# Patient Record
Sex: FEMALE | Race: WHITE | NOT HISPANIC OR LATINO | URBAN - METROPOLITAN AREA
[De-identification: names, ages, dates, MRNs, and addresses within clinical notes are randomized per-mention and may not be internally consistent; named-entity substitution may affect disease eponyms.]

---

## 2017-05-26 ENCOUNTER — INPATIENT (INPATIENT)
Facility: HOSPITAL | Age: 72
LOS: 4 days | Discharge: HOME HEALTH | End: 2017-05-31
Attending: ORTHOPAEDIC SURGERY | Admitting: ORTHOPAEDIC SURGERY
Payer: MEDICARE

## 2017-05-26 VITALS
HEART RATE: 60 BPM | WEIGHT: 134.92 LBS | TEMPERATURE: 98 F | SYSTOLIC BLOOD PRESSURE: 155 MMHG | RESPIRATION RATE: 18 BRPM | HEIGHT: 64 IN | DIASTOLIC BLOOD PRESSURE: 99 MMHG | OXYGEN SATURATION: 99 %

## 2017-05-26 DIAGNOSIS — Z96.643 PRESENCE OF ARTIFICIAL HIP JOINT, BILATERAL: Chronic | ICD-10-CM

## 2017-05-26 LAB
ALBUMIN SERPL ELPH-MCNC: 3.7 G/DL — SIGNIFICANT CHANGE UP (ref 3.3–5)
ALP SERPL-CCNC: 52 U/L — SIGNIFICANT CHANGE UP (ref 40–120)
ALT FLD-CCNC: 29 U/L — SIGNIFICANT CHANGE UP (ref 12–78)
ANION GAP SERPL CALC-SCNC: 5 MMOL/L — SIGNIFICANT CHANGE UP (ref 5–17)
AST SERPL-CCNC: 22 U/L — SIGNIFICANT CHANGE UP (ref 15–37)
BASOPHILS # BLD AUTO: 0.1 K/UL — SIGNIFICANT CHANGE UP (ref 0–0.2)
BASOPHILS NFR BLD AUTO: 0.5 % — SIGNIFICANT CHANGE UP (ref 0–2)
BILIRUB SERPL-MCNC: 0.4 MG/DL — SIGNIFICANT CHANGE UP (ref 0.2–1.2)
BUN SERPL-MCNC: 21 MG/DL — SIGNIFICANT CHANGE UP (ref 7–23)
CALCIUM SERPL-MCNC: 8.4 MG/DL — LOW (ref 8.5–10.1)
CHLORIDE SERPL-SCNC: 106 MMOL/L — SIGNIFICANT CHANGE UP (ref 96–108)
CO2 SERPL-SCNC: 25 MMOL/L — SIGNIFICANT CHANGE UP (ref 22–31)
CREAT SERPL-MCNC: 1.02 MG/DL — SIGNIFICANT CHANGE UP (ref 0.5–1.3)
EOSINOPHIL # BLD AUTO: 0 K/UL — SIGNIFICANT CHANGE UP (ref 0–0.5)
EOSINOPHIL NFR BLD AUTO: 0.2 % — SIGNIFICANT CHANGE UP (ref 0–6)
GLUCOSE SERPL-MCNC: 135 MG/DL — HIGH (ref 70–99)
HCT VFR BLD CALC: 38.1 % — SIGNIFICANT CHANGE UP (ref 34.5–45)
HGB BLD-MCNC: 13.3 G/DL — SIGNIFICANT CHANGE UP (ref 11.5–15.5)
LYMPHOCYTES # BLD AUTO: 1.5 K/UL — SIGNIFICANT CHANGE UP (ref 1–3.3)
LYMPHOCYTES # BLD AUTO: 12.4 % — LOW (ref 13–44)
MCHC RBC-ENTMCNC: 33.2 PG — SIGNIFICANT CHANGE UP (ref 27–34)
MCHC RBC-ENTMCNC: 34.8 GM/DL — SIGNIFICANT CHANGE UP (ref 32–36)
MCV RBC AUTO: 95.4 FL — SIGNIFICANT CHANGE UP (ref 80–100)
MONOCYTES # BLD AUTO: 0.4 K/UL — SIGNIFICANT CHANGE UP (ref 0–0.9)
MONOCYTES NFR BLD AUTO: 3.3 % — SIGNIFICANT CHANGE UP (ref 2–14)
NEUTROPHILS # BLD AUTO: 10 K/UL — HIGH (ref 1.8–7.4)
NEUTROPHILS NFR BLD AUTO: 83.7 % — HIGH (ref 43–77)
PLATELET # BLD AUTO: 189 K/UL — SIGNIFICANT CHANGE UP (ref 150–400)
POTASSIUM SERPL-MCNC: 4.4 MMOL/L — SIGNIFICANT CHANGE UP (ref 3.5–5.3)
POTASSIUM SERPL-SCNC: 4.4 MMOL/L — SIGNIFICANT CHANGE UP (ref 3.5–5.3)
PROT SERPL-MCNC: 6.6 GM/DL — SIGNIFICANT CHANGE UP (ref 6–8.3)
RBC # BLD: 4 M/UL — SIGNIFICANT CHANGE UP (ref 3.8–5.2)
RBC # FLD: 12 % — SIGNIFICANT CHANGE UP (ref 10.3–14.5)
SODIUM SERPL-SCNC: 136 MMOL/L — SIGNIFICANT CHANGE UP (ref 135–145)
WBC # BLD: 12 K/UL — HIGH (ref 3.8–10.5)
WBC # FLD AUTO: 12 K/UL — HIGH (ref 3.8–10.5)

## 2017-05-26 PROCEDURE — 71010: CPT | Mod: 26

## 2017-05-26 PROCEDURE — 73502 X-RAY EXAM HIP UNI 2-3 VIEWS: CPT | Mod: 26

## 2017-05-26 PROCEDURE — 73590 X-RAY EXAM OF LOWER LEG: CPT | Mod: 26,RT

## 2017-05-26 PROCEDURE — 73562 X-RAY EXAM OF KNEE 3: CPT | Mod: 26,RT

## 2017-05-26 PROCEDURE — 73552 X-RAY EXAM OF FEMUR 2/>: CPT | Mod: 26

## 2017-05-26 RX ORDER — ONDANSETRON 8 MG/1
4 TABLET, FILM COATED ORAL ONCE
Qty: 0 | Refills: 0 | Status: COMPLETED | OUTPATIENT
Start: 2017-05-26 | End: 2017-05-26

## 2017-05-26 RX ORDER — MORPHINE SULFATE 50 MG/1
4 CAPSULE, EXTENDED RELEASE ORAL ONCE
Qty: 0 | Refills: 0 | Status: DISCONTINUED | OUTPATIENT
Start: 2017-05-26 | End: 2017-05-27

## 2017-05-26 RX ORDER — MORPHINE SULFATE 50 MG/1
4 CAPSULE, EXTENDED RELEASE ORAL ONCE
Qty: 0 | Refills: 0 | Status: DISCONTINUED | OUTPATIENT
Start: 2017-05-26 | End: 2017-05-26

## 2017-05-26 RX ORDER — SODIUM CHLORIDE 9 MG/ML
1000 INJECTION INTRAMUSCULAR; INTRAVENOUS; SUBCUTANEOUS ONCE
Qty: 0 | Refills: 0 | Status: COMPLETED | OUTPATIENT
Start: 2017-05-26 | End: 2017-05-26

## 2017-05-26 RX ADMIN — MORPHINE SULFATE 4 MILLIGRAM(S): 50 CAPSULE, EXTENDED RELEASE ORAL at 20:01

## 2017-05-26 RX ADMIN — MORPHINE SULFATE 4 MILLIGRAM(S): 50 CAPSULE, EXTENDED RELEASE ORAL at 19:46

## 2017-05-26 RX ADMIN — SODIUM CHLORIDE 1000 MILLILITER(S): 9 INJECTION INTRAMUSCULAR; INTRAVENOUS; SUBCUTANEOUS at 19:46

## 2017-05-26 RX ADMIN — ONDANSETRON 4 MILLIGRAM(S): 8 TABLET, FILM COATED ORAL at 19:46

## 2017-05-26 NOTE — ED PROVIDER NOTE - PROGRESS NOTE DETAILS
Fani Hughes: Xray reviewed: Convoluted proximal tibial fx, involing tibial plataeu w/ ? LCL involvement given some lat displacement. Fx appears in multiple plains. Ortho is paged. Fani Hughes: RAFAEL Hughes, am scribing for and in the presence of Dr. Gomez  the following sections: HPI, PMH, Intake, Results, ROS, PE, Progress note, Disposition Yosef Gomez DO (Attending): Seen by ortho, admit to Dr. Rondon

## 2017-05-26 NOTE — ED PROVIDER NOTE - CONSTITUTIONAL, MLM
normal... Well appearing, well nourished, awake, alert, oriented to person, place, time/situation and in no apparent distress. GCS 15

## 2017-05-26 NOTE — ED PROVIDER NOTE - OBJECTIVE STATEMENT
71F c/o right leg pain s/p fall at home PTA. Pt was outside on a 4 foot ladder when it fell from benath her and she landed directly on her right leg. c/o right knee swelling and pain and a "crunching" feeling. Denies head injury, LOC or pain anywhere else. PMH bilateral hip replacements at [facility]. Denies hip pin. PMD [place].

## 2017-05-26 NOTE — ED ADULT NURSE NOTE - CHPI ED SYMPTOMS NEG
no nausea/no tingling/no numbness/no weakness/no vomiting/no dizziness/no chills/no fever/no decreased eating/drinking

## 2017-05-26 NOTE — ED PROVIDER NOTE - NS ED MD SCRIBE ATTENDING SCRIBE SECTIONS
CONSULTATIONS/SHIFT CHANGE/PROGRESS NOTE/PHYSICAL EXAM/REVIEW OF SYSTEMS/VITAL SIGNS( Pullset)/HIV/RESULTS/PAST MEDICAL/SURGICAL/SOCIAL HISTORY/HISTORY OF PRESENT ILLNESS/DISPOSITION/INTAKE ASSESSMENT/SCREENINGS

## 2017-05-26 NOTE — ED PROVIDER NOTE - MUSCULOSKELETAL, MLM
no spinal tenderness, no chest or abdominal tenderness. +swelling and tenderness to right knee. 2+ DP/PT pulse, foot warm cap refill< 2 sec all toes.

## 2017-05-26 NOTE — ED PROVIDER NOTE - ATTENDING CONTRIBUTION TO CARE
I, Yosef Gomez DO,  performed the initial face to face bedside interview with this patient regarding history of present illness, review of symptoms and relevant past medical, social and family history.  I completed an independent physical examination.  I was the initial provider who evaluated this patient. I have signed out the follow up of any pending tests (i.e. labs, radiological studies) to the ACP.  I have communicated the patient’s plan of care and disposition with the ACP.  The history, relevant review of systems, past medical and surgical history, medical decision making, and physical examination was documented by the scribe in my presence and I attest to the accuracy of the documentation.

## 2017-05-27 LAB
ABO RH CONFIRMATION: SIGNIFICANT CHANGE UP
ANION GAP SERPL CALC-SCNC: 7 MMOL/L — SIGNIFICANT CHANGE UP (ref 5–17)
APTT BLD: 29.7 SEC — SIGNIFICANT CHANGE UP (ref 27.5–37.4)
APTT BLD: 30 SEC — SIGNIFICANT CHANGE UP (ref 27.5–37.4)
BLD GP AB SCN SERPL QL: SIGNIFICANT CHANGE UP
BUN SERPL-MCNC: 16 MG/DL — SIGNIFICANT CHANGE UP (ref 7–23)
CALCIUM SERPL-MCNC: 8.2 MG/DL — LOW (ref 8.5–10.1)
CHLORIDE SERPL-SCNC: 104 MMOL/L — SIGNIFICANT CHANGE UP (ref 96–108)
CO2 SERPL-SCNC: 25 MMOL/L — SIGNIFICANT CHANGE UP (ref 22–31)
CREAT SERPL-MCNC: 0.81 MG/DL — SIGNIFICANT CHANGE UP (ref 0.5–1.3)
GLUCOSE SERPL-MCNC: 104 MG/DL — HIGH (ref 70–99)
HCT VFR BLD CALC: 35.2 % — SIGNIFICANT CHANGE UP (ref 34.5–45)
HGB BLD-MCNC: 12.2 G/DL — SIGNIFICANT CHANGE UP (ref 11.5–15.5)
INR BLD: 1 RATIO — SIGNIFICANT CHANGE UP (ref 0.88–1.16)
INR BLD: 1.02 RATIO — SIGNIFICANT CHANGE UP (ref 0.88–1.16)
MCHC RBC-ENTMCNC: 33.2 PG — SIGNIFICANT CHANGE UP (ref 27–34)
MCHC RBC-ENTMCNC: 34.6 GM/DL — SIGNIFICANT CHANGE UP (ref 32–36)
MCV RBC AUTO: 96.2 FL — SIGNIFICANT CHANGE UP (ref 80–100)
PLATELET # BLD AUTO: 174 K/UL — SIGNIFICANT CHANGE UP (ref 150–400)
POTASSIUM SERPL-MCNC: 3.9 MMOL/L — SIGNIFICANT CHANGE UP (ref 3.5–5.3)
POTASSIUM SERPL-SCNC: 3.9 MMOL/L — SIGNIFICANT CHANGE UP (ref 3.5–5.3)
PROTHROM AB SERPL-ACNC: 10.8 SEC — SIGNIFICANT CHANGE UP (ref 9.8–12.7)
PROTHROM AB SERPL-ACNC: 11 SEC — SIGNIFICANT CHANGE UP (ref 9.8–12.7)
RBC # BLD: 3.66 M/UL — LOW (ref 3.8–5.2)
RBC # FLD: 11.9 % — SIGNIFICANT CHANGE UP (ref 10.3–14.5)
SODIUM SERPL-SCNC: 136 MMOL/L — SIGNIFICANT CHANGE UP (ref 135–145)
TYPE + AB SCN PNL BLD: SIGNIFICANT CHANGE UP
WBC # BLD: 9 K/UL — SIGNIFICANT CHANGE UP (ref 3.8–10.5)
WBC # FLD AUTO: 9 K/UL — SIGNIFICANT CHANGE UP (ref 3.8–10.5)

## 2017-05-27 PROCEDURE — 73700 CT LOWER EXTREMITY W/O DYE: CPT | Mod: 26,RT

## 2017-05-27 PROCEDURE — 73560 X-RAY EXAM OF KNEE 1 OR 2: CPT | Mod: 26,RT

## 2017-05-27 PROCEDURE — 99285 EMERGENCY DEPT VISIT HI MDM: CPT

## 2017-05-27 PROCEDURE — 93010 ELECTROCARDIOGRAM REPORT: CPT

## 2017-05-27 PROCEDURE — 76377 3D RENDER W/INTRP POSTPROCES: CPT | Mod: 26

## 2017-05-27 RX ORDER — SODIUM CHLORIDE 9 MG/ML
1000 INJECTION INTRAMUSCULAR; INTRAVENOUS; SUBCUTANEOUS
Qty: 0 | Refills: 0 | Status: DISCONTINUED | OUTPATIENT
Start: 2017-05-27 | End: 2017-05-27

## 2017-05-27 RX ORDER — METOCLOPRAMIDE HCL 10 MG
10 TABLET ORAL EVERY 6 HOURS
Qty: 0 | Refills: 0 | Status: DISCONTINUED | OUTPATIENT
Start: 2017-05-27 | End: 2017-05-27

## 2017-05-27 RX ORDER — ACETAMINOPHEN 500 MG
650 TABLET ORAL EVERY 6 HOURS
Qty: 0 | Refills: 0 | Status: DISCONTINUED | OUTPATIENT
Start: 2017-05-27 | End: 2017-05-28

## 2017-05-27 RX ORDER — KETOROLAC TROMETHAMINE 30 MG/ML
15 SYRINGE (ML) INJECTION EVERY 6 HOURS
Qty: 0 | Refills: 0 | Status: DISCONTINUED | OUTPATIENT
Start: 2017-05-27 | End: 2017-05-27

## 2017-05-27 RX ORDER — ONDANSETRON 8 MG/1
4 TABLET, FILM COATED ORAL EVERY 6 HOURS
Qty: 0 | Refills: 0 | Status: COMPLETED | OUTPATIENT
Start: 2017-05-27 | End: 2017-05-27

## 2017-05-27 RX ORDER — KETOROLAC TROMETHAMINE 30 MG/ML
15 SYRINGE (ML) INJECTION EVERY 6 HOURS
Qty: 0 | Refills: 0 | Status: DISCONTINUED | OUTPATIENT
Start: 2017-05-27 | End: 2017-05-28

## 2017-05-27 RX ORDER — DOCUSATE SODIUM 100 MG
100 CAPSULE ORAL THREE TIMES A DAY
Qty: 0 | Refills: 0 | Status: DISCONTINUED | OUTPATIENT
Start: 2017-05-27 | End: 2017-05-27

## 2017-05-27 RX ORDER — SODIUM CHLORIDE 9 MG/ML
1000 INJECTION INTRAMUSCULAR; INTRAVENOUS; SUBCUTANEOUS
Qty: 0 | Refills: 0 | Status: DISCONTINUED | OUTPATIENT
Start: 2017-05-27 | End: 2017-05-28

## 2017-05-27 RX ORDER — OXYCODONE HYDROCHLORIDE 5 MG/1
5 TABLET ORAL EVERY 4 HOURS
Qty: 0 | Refills: 0 | Status: DISCONTINUED | OUTPATIENT
Start: 2017-05-27 | End: 2017-05-28

## 2017-05-27 RX ORDER — FERROUS SULFATE 325(65) MG
325 TABLET ORAL
Qty: 0 | Refills: 0 | Status: DISCONTINUED | OUTPATIENT
Start: 2017-05-27 | End: 2017-05-28

## 2017-05-27 RX ORDER — ACETAMINOPHEN 500 MG
325 TABLET ORAL EVERY 4 HOURS
Qty: 0 | Refills: 0 | Status: DISCONTINUED | OUTPATIENT
Start: 2017-05-27 | End: 2017-05-27

## 2017-05-27 RX ORDER — DIPHENHYDRAMINE HCL 50 MG
25 CAPSULE ORAL AT BEDTIME
Qty: 0 | Refills: 0 | Status: DISCONTINUED | OUTPATIENT
Start: 2017-05-27 | End: 2017-05-28

## 2017-05-27 RX ORDER — DOCUSATE SODIUM 100 MG
200 CAPSULE ORAL DAILY
Qty: 0 | Refills: 0 | Status: DISCONTINUED | OUTPATIENT
Start: 2017-05-27 | End: 2017-05-28

## 2017-05-27 RX ORDER — FOLIC ACID 0.8 MG
1 TABLET ORAL DAILY
Qty: 0 | Refills: 0 | Status: DISCONTINUED | OUTPATIENT
Start: 2017-05-27 | End: 2017-05-28

## 2017-05-27 RX ORDER — OXYCODONE HYDROCHLORIDE 5 MG/1
10 TABLET ORAL EVERY 4 HOURS
Qty: 0 | Refills: 0 | Status: DISCONTINUED | OUTPATIENT
Start: 2017-05-27 | End: 2017-05-28

## 2017-05-27 RX ORDER — MAGNESIUM HYDROXIDE 400 MG/1
30 TABLET, CHEWABLE ORAL DAILY
Qty: 0 | Refills: 0 | Status: DISCONTINUED | OUTPATIENT
Start: 2017-05-27 | End: 2017-05-28

## 2017-05-27 RX ORDER — HYDROMORPHONE HYDROCHLORIDE 2 MG/ML
0.5 INJECTION INTRAMUSCULAR; INTRAVENOUS; SUBCUTANEOUS EVERY 4 HOURS
Qty: 0 | Refills: 0 | Status: DISCONTINUED | OUTPATIENT
Start: 2017-05-27 | End: 2017-05-27

## 2017-05-27 RX ORDER — SERTRALINE 25 MG/1
100 TABLET, FILM COATED ORAL DAILY
Qty: 0 | Refills: 0 | Status: DISCONTINUED | OUTPATIENT
Start: 2017-05-27 | End: 2017-05-28

## 2017-05-27 RX ORDER — HYDROMORPHONE HYDROCHLORIDE 2 MG/ML
1 INJECTION INTRAMUSCULAR; INTRAVENOUS; SUBCUTANEOUS
Qty: 0 | Refills: 0 | Status: DISCONTINUED | OUTPATIENT
Start: 2017-05-27 | End: 2017-05-28

## 2017-05-27 RX ORDER — ASCORBIC ACID 60 MG
500 TABLET,CHEWABLE ORAL
Qty: 0 | Refills: 0 | Status: DISCONTINUED | OUTPATIENT
Start: 2017-05-27 | End: 2017-05-28

## 2017-05-27 RX ORDER — HEPARIN SODIUM 5000 [USP'U]/ML
5000 INJECTION INTRAVENOUS; SUBCUTANEOUS EVERY 8 HOURS
Qty: 0 | Refills: 0 | Status: COMPLETED | OUTPATIENT
Start: 2017-05-27 | End: 2017-05-27

## 2017-05-27 RX ADMIN — Medication 15 MILLIGRAM(S): at 18:42

## 2017-05-27 RX ADMIN — HYDROMORPHONE HYDROCHLORIDE 0.5 MILLIGRAM(S): 2 INJECTION INTRAMUSCULAR; INTRAVENOUS; SUBCUTANEOUS at 12:27

## 2017-05-27 RX ADMIN — Medication 325 MILLIGRAM(S): at 09:53

## 2017-05-27 RX ADMIN — ONDANSETRON 4 MILLIGRAM(S): 8 TABLET, FILM COATED ORAL at 18:56

## 2017-05-27 RX ADMIN — Medication 15 MILLIGRAM(S): at 13:27

## 2017-05-27 RX ADMIN — SODIUM CHLORIDE 83 MILLILITER(S): 9 INJECTION INTRAMUSCULAR; INTRAVENOUS; SUBCUTANEOUS at 23:31

## 2017-05-27 RX ADMIN — OXYCODONE HYDROCHLORIDE 10 MILLIGRAM(S): 5 TABLET ORAL at 01:55

## 2017-05-27 RX ADMIN — MORPHINE SULFATE 4 MILLIGRAM(S): 50 CAPSULE, EXTENDED RELEASE ORAL at 00:31

## 2017-05-27 RX ADMIN — Medication 325 MILLIGRAM(S): at 17:06

## 2017-05-27 RX ADMIN — HYDROMORPHONE HYDROCHLORIDE 0.5 MILLIGRAM(S): 2 INJECTION INTRAMUSCULAR; INTRAVENOUS; SUBCUTANEOUS at 08:15

## 2017-05-27 RX ADMIN — Medication 1 MILLIGRAM(S): at 12:27

## 2017-05-27 RX ADMIN — HEPARIN SODIUM 5000 UNIT(S): 5000 INJECTION INTRAVENOUS; SUBCUTANEOUS at 17:06

## 2017-05-27 RX ADMIN — HYDROMORPHONE HYDROCHLORIDE 0.5 MILLIGRAM(S): 2 INJECTION INTRAMUSCULAR; INTRAVENOUS; SUBCUTANEOUS at 02:50

## 2017-05-27 RX ADMIN — Medication 325 MILLIGRAM(S): at 12:27

## 2017-05-27 RX ADMIN — SODIUM CHLORIDE 100 MILLILITER(S): 9 INJECTION INTRAMUSCULAR; INTRAVENOUS; SUBCUTANEOUS at 02:45

## 2017-05-27 RX ADMIN — HYDROMORPHONE HYDROCHLORIDE 0.5 MILLIGRAM(S): 2 INJECTION INTRAMUSCULAR; INTRAVENOUS; SUBCUTANEOUS at 03:59

## 2017-05-27 RX ADMIN — SERTRALINE 100 MILLIGRAM(S): 25 TABLET, FILM COATED ORAL at 12:28

## 2017-05-27 RX ADMIN — OXYCODONE HYDROCHLORIDE 10 MILLIGRAM(S): 5 TABLET ORAL at 06:18

## 2017-05-27 RX ADMIN — Medication 1 TABLET(S): at 12:28

## 2017-05-27 RX ADMIN — Medication 15 MILLIGRAM(S): at 23:31

## 2017-05-27 RX ADMIN — SODIUM CHLORIDE 100 MILLILITER(S): 9 INJECTION INTRAMUSCULAR; INTRAVENOUS; SUBCUTANEOUS at 12:28

## 2017-05-27 RX ADMIN — Medication 15 MILLIGRAM(S): at 18:39

## 2017-05-27 RX ADMIN — HEPARIN SODIUM 5000 UNIT(S): 5000 INJECTION INTRAVENOUS; SUBCUTANEOUS at 09:53

## 2017-05-27 RX ADMIN — Medication 500 MILLIGRAM(S): at 17:06

## 2017-05-27 RX ADMIN — Medication 10 MILLIGRAM(S): at 13:21

## 2017-05-27 RX ADMIN — OXYCODONE HYDROCHLORIDE 10 MILLIGRAM(S): 5 TABLET ORAL at 03:59

## 2017-05-27 RX ADMIN — MORPHINE SULFATE 4 MILLIGRAM(S): 50 CAPSULE, EXTENDED RELEASE ORAL at 00:16

## 2017-05-27 NOTE — H&P ADULT - NSHPPHYSICALEXAM_GEN_ALL_CORE
AAOx3   Vital Signs Last 24 Hrs  T(C): 37.3, Max: 37.3 (05-26 @ 22:44)  T(F): 99.1, Max: 99.1 (05-26 @ 22:44)  HR: 55 (55 - 60)  BP: 134/74 (134/74 - 155/99)  BP(mean): --  RR: 18 (18 - 18)  SpO2: 100% (99% - 100%)    Head ATNC   Right knee unable to asses stability secondary to pt condition, Skin is intact over fx area, she has obvious swelling but compartments are soft and compressible, there is a palpable DP/PT pulse equal to the opposite side. E/F/T/G M+, L2-S1 Is SILT.   She has no TTP or Pain with ROM to any other joints or long bones in both the upper and lower extremities and she has no back or neck pain on exam.

## 2017-05-27 NOTE — CONSULT NOTE ADULT - SUBJECTIVE AND OBJECTIVE BOX
71 female, denies any past medical history other than anxiety and depression for which she is medicated, s/p bilateral total hips done at [facility] by Ti, and L ACL Recon done in the 80's. Here today s/p fall around 4 pm off a ladder at aprroximatly 4 feet above the ground. experienced an axial load through her foot and had immediate pain in her right knee. she was unable to ambulate and was brought to [facility] where xr revealed a bicondylar fx of her tibial plateau. She denies HS or LOC, She denies any other pain.     Excellent functional status. Very active, dances vigorously each week wi/o central CP/SOB/palpitations, dizziness. No prior cardiopulmonary Hx.    c/t experience breakthrough pain & spasm in the RLE.     FamHx: non-contributory to current presentation  SocHx: lifelong nonsmoker, daily 1 glass of wine, no illicits; health teacher & masseuse  Allergies: NKDA    Physical Exam  Gen: alert, NAD  CV: reg, no M/R/G  Resp: CTA B/L  Abd: soft, NT/ND, (+) BS  Extrems: no C/C/E    T(C): 36.8, Max: 37.3 (05-26 @ 22:44)  HR: 62 (55 - 62)  BP: 109/61 (109/61 - 155/99)  RR: 18 (18 - 20)  SpO2: 97% (97% - 100%)  Wt(kg): --                        12.2   9.0   )-----------( 174      ( 27 May 2017 10:00 )             35.2     27 May 2017 10:00    136    |  104    |  16     ----------------------------<  104    3.9     |  25     |  0.81     Ca    8.2        27 May 2017 10:00    TPro  6.6    /  Alb  3.7    /  TBili  0.4    /  DBili  x      /  AST  22     /  ALT  29     /  AlkPhos  52     26 May 2017 21:05    PT/INR - ( 27 May 2017 10:00 )   PT: 11.0 sec;   INR: 1.02 ratio         PTT - ( 27 May 2017 10:00 )  PTT:30.0 sec  CAPILLARY BLOOD GLUCOSE    LIVER FUNCTIONS - ( 26 May 2017 21:05 )  Alb: 3.7 g/dL / Pro: 6.6 gm/dL / ALK PHOS: 52 U/L / ALT: 29 U/L / AST: 22 U/L / GGT: x           Assessment/Plan    * Traumatic right bicondylar tibial plateau fx   - increase analgesia, bowel regimen  - Toradol ATC x 36hrs  - NPO after midnight, anticipate OR 5/28  - Ortho f/u     * depression/anxiety, stable  - c/w Zoloft    * DVT PPx- heparin SQ    Pt is medically optimized with no cardiopulmonary contraindications to planned procedure.

## 2017-05-27 NOTE — H&P ADULT - ASSESSMENT
71 F with bicondylar tibial plateau fx   pain control   NPO   Admit for pain mgmt and compartment observation   Possible surgical fixation in the morning depending on swelling and pt condition   Medical eval   Bulky haywood knee immobilizer placed   CT Pending   To discuss with dr corado in AM

## 2017-05-27 NOTE — CONSULT NOTE ADULT - SUBJECTIVE AND OBJECTIVE BOX
CC- s/p mechanical fall    HPI:  70 yo/female, denies any past medical history other than anxiety and depression for which she is medicated, s/p bilateral total hips done at [facility] by Ti, and L ACL Recon done in the 80's. Here today s/p fall around 4 pm off a ladder at aprroximatly 4 feet above the ground. experienced an axial load through her foot and had immediate pain in her right knee. she was unable to ambulate and was brought to [facility] where xr revealed a bicondylar fx of her tibial plateau. She denies HS or LOC, She denies any other pain. (27 May 2017 00:34)  Medical consult called for postop medical management. Patient normally is very active, does not have chest pain or SOB on ambulation    PMH- as above  PSH- b/l THR  Soc hx- denies smoking, alcohol- socially  Fam hx- no cardiac diseases     T(C): 36.8, Max: 37.3 (05-26 @ 22:44)  HR: 62 (55 - 62)  BP: 109/61 (109/61 - 155/99)  RR: 18 (18 - 20)  SpO2: 97% (97% - 100%)  Wt(kg): --    LABS:                        12.2   9.0   )-----------( 174      ( 27 May 2017 10:00 )             35.2     05-27    136  |  104  |  16  ----------------------------<  104<H>  3.9   |  25  |  0.81    Ca    8.2<L>      27 May 2017 10:00  TPro  6.6  /  Alb  3.7  /  TBili  0.4  /  DBili  x   /  AST  22  /  ALT  29  /  AlkPhos  52  05-26  PT/INR - ( 27 May 2017 10:00 )   PT: 11.0 sec;   INR: 1.02 ratio    PTT - ( 27 May 2017 10:00 )  PTT:30.0 sec    RADIOLOGY & ADDITIONAL TESTS:  EXAM:  XR KNEE 2 VIEWS RT                        PROCEDURE DATE:  05/27/2017      INTERPRETATION:  Exam Date: 5/27/2017 3:17 AM  Clinical Information: Fall with right lower extremity pain  Technique: Single view of the pelvis , 2 views of theright hip, 2 views   of the left hip, 4 views of the right femur, 3 views of the right knee, 4   views of the right leg and 3 post reduction views of the right knee     Findings:    Prior bilateral total hip replacements. Degenerative changes of the   pelvis partially visualized lumbar sacral spine. No definite pelvic or   bilateral hip fracture. No right femur fracture. Moderate right   suprapatellar lipohemarthrosis.  Comminuted displaced impacted right   posterior and medial tibial plateau fracture extending into the proximal   tibia. Tibiofemoral subluxation. Partially visualized ankle mortise   appears intact. No definite fibular fracture.    Post reduction imaging demonstrates persistent comminuted displaced   intra-articular tibial plateau fracture. Additional subluxation of the   tibiofemoral joint persists.    Impression:  Comminuted displaced impacted intra-articular fracture of the right   medial posterior tibial plateau extending into the proximal tibia with   associated lipohemarthrosis. Additional subluxation of the tibiofemoral   joint    PHYSICAL EXAM:  GENERAL: NAD, well-groomed, well-developed  HEAD:  Atraumatic, Normocephalic  EYES: EOMI, PERRLA, conjunctiva and sclera clear  HEENT: Moist mucous membranes  NECK: Supple, No JVD  NERVOUS SYSTEM:  Alert & Oriented X3, Motor Strength 5/5 B/L upper and lower extremities; DTRs 2+ intact and symmetric  CHEST/LUNG: Clear to auscultation bilaterally; No rales, rhonchi, wheezing, or rubs  HEART: Regular rate and rhythm; No murmurs, rubs, or gallops  ABDOMEN: Soft, Nontender, Nondistended; Bowel sounds present  GENITOURINARY- Voiding, no palpable bladder  EXTREMITIES:  2+ Peripheral Pulses, No clubbing, cyanosis, or edema  MUSCULOSKELTAL- Rt leg in immobilizer  SKIN-no rash, no lesion  CNS- alert, oriented X3, non focal     Daily Height in cm: 162.56 (26 May 2017 19:27)        sodium chloride 0.9%. 1000milliLiter(s) IV Continuous <Continuous>  oxyCODONE IR 10milliGRAM(s) Oral every 4 hours PRN  oxyCODONE IR 5milliGRAM(s) Oral every 4 hours PRN  metoclopramide Injectable 10milliGRAM(s) IV Push every 6 hours PRN  magnesium hydroxide Suspension 30milliLiter(s) Oral daily PRN  diphenhydrAMINE   Capsule 25milliGRAM(s) Oral at bedtime PRN  ferrous    sulfate 325milliGRAM(s) Oral three times a day with meals  folic acid 1milliGRAM(s) Oral daily  multivitamin 1Tablet(s) Oral daily  ascorbic acid 500milliGRAM(s) Oral two times a day  sertraline 100milliGRAM(s) Oral daily  heparin  Injectable 5000Unit(s) SubCutaneous every 8 hours  docusate sodium 200milliGRAM(s) Oral daily  acetaminophen  Suppository 650milliGRAM(s) Rectal every 6 hours PRN  ketorolac   Injectable 15milliGRAM(s) IV Push every 6 hours  HYDROmorphone  Injectable 1milliGRAM(s) IV Push every 3 hours PRN    Assessment/Plan  #RT tibial plateau fracture s/p mechanical fall  Ortho eval appreciated  Bedrest, Pain meds prn  Pt is medically optimized for OR    #Dispo- thank you for consult, will follow with you. Patient is medically optimized for OR

## 2017-05-27 NOTE — H&P ADULT - HISTORY OF PRESENT ILLNESS
71 female, denies any past medical history other than anxiety and depression for which she is medicated, s/p bilateral total hips done at [facility] by Ti, and L ACL Recon done in the 80's. Here today s/p fall around 4 pm off a ladder at aprroximatly 4 feet above the ground. experienced an axial load through her foot and had immediate pain in her right knee. she was unable to ambulate and was brought to [facility] where xr revealed a bicondylar fx of her tibial plateau. She denies HS or LOC, She denies any other pain.

## 2017-05-28 LAB
ANION GAP SERPL CALC-SCNC: 5 MMOL/L — SIGNIFICANT CHANGE UP (ref 5–17)
BUN SERPL-MCNC: 10 MG/DL — SIGNIFICANT CHANGE UP (ref 7–23)
CALCIUM SERPL-MCNC: 8.1 MG/DL — LOW (ref 8.5–10.1)
CHLORIDE SERPL-SCNC: 107 MMOL/L — SIGNIFICANT CHANGE UP (ref 96–108)
CO2 SERPL-SCNC: 27 MMOL/L — SIGNIFICANT CHANGE UP (ref 22–31)
CREAT SERPL-MCNC: 0.83 MG/DL — SIGNIFICANT CHANGE UP (ref 0.5–1.3)
GLUCOSE SERPL-MCNC: 103 MG/DL — HIGH (ref 70–99)
HCT VFR BLD CALC: 33.9 % — LOW (ref 34.5–45)
HGB BLD-MCNC: 11.3 G/DL — LOW (ref 11.5–15.5)
MAGNESIUM SERPL-MCNC: 2.1 MG/DL — SIGNIFICANT CHANGE UP (ref 1.6–2.6)
MCHC RBC-ENTMCNC: 32.5 PG — SIGNIFICANT CHANGE UP (ref 27–34)
MCHC RBC-ENTMCNC: 33.3 GM/DL — SIGNIFICANT CHANGE UP (ref 32–36)
MCV RBC AUTO: 97.5 FL — SIGNIFICANT CHANGE UP (ref 80–100)
PHOSPHATE SERPL-MCNC: 2.8 MG/DL — SIGNIFICANT CHANGE UP (ref 2.5–4.5)
PLATELET # BLD AUTO: 164 K/UL — SIGNIFICANT CHANGE UP (ref 150–400)
POTASSIUM SERPL-MCNC: 4.1 MMOL/L — SIGNIFICANT CHANGE UP (ref 3.5–5.3)
POTASSIUM SERPL-SCNC: 4.1 MMOL/L — SIGNIFICANT CHANGE UP (ref 3.5–5.3)
RBC # BLD: 3.48 M/UL — LOW (ref 3.8–5.2)
RBC # FLD: 12 % — SIGNIFICANT CHANGE UP (ref 10.3–14.5)
SODIUM SERPL-SCNC: 139 MMOL/L — SIGNIFICANT CHANGE UP (ref 135–145)
WBC # BLD: 7.4 K/UL — SIGNIFICANT CHANGE UP (ref 3.8–10.5)
WBC # FLD AUTO: 7.4 K/UL — SIGNIFICANT CHANGE UP (ref 3.8–10.5)

## 2017-05-28 PROCEDURE — 73560 X-RAY EXAM OF KNEE 1 OR 2: CPT | Mod: 26,RT

## 2017-05-28 RX ORDER — OXYCODONE HYDROCHLORIDE 5 MG/1
5 TABLET ORAL EVERY 4 HOURS
Qty: 0 | Refills: 0 | Status: DISCONTINUED | OUTPATIENT
Start: 2017-05-28 | End: 2017-05-31

## 2017-05-28 RX ORDER — SODIUM CHLORIDE 9 MG/ML
1000 INJECTION INTRAMUSCULAR; INTRAVENOUS; SUBCUTANEOUS
Qty: 0 | Refills: 0 | Status: DISCONTINUED | OUTPATIENT
Start: 2017-05-28 | End: 2017-05-28

## 2017-05-28 RX ORDER — ENOXAPARIN SODIUM 100 MG/ML
30 INJECTION SUBCUTANEOUS EVERY 24 HOURS
Qty: 0 | Refills: 0 | Status: DISCONTINUED | OUTPATIENT
Start: 2017-05-28 | End: 2017-05-29

## 2017-05-28 RX ORDER — DOCUSATE SODIUM 100 MG
100 CAPSULE ORAL THREE TIMES A DAY
Qty: 0 | Refills: 0 | Status: DISCONTINUED | OUTPATIENT
Start: 2017-05-28 | End: 2017-05-31

## 2017-05-28 RX ORDER — HYDROMORPHONE HYDROCHLORIDE 2 MG/ML
0.5 INJECTION INTRAMUSCULAR; INTRAVENOUS; SUBCUTANEOUS EVERY 4 HOURS
Qty: 0 | Refills: 0 | Status: DISCONTINUED | OUTPATIENT
Start: 2017-05-28 | End: 2017-05-29

## 2017-05-28 RX ORDER — ONDANSETRON 8 MG/1
4 TABLET, FILM COATED ORAL EVERY 6 HOURS
Qty: 0 | Refills: 0 | Status: DISCONTINUED | OUTPATIENT
Start: 2017-05-28 | End: 2017-05-31

## 2017-05-28 RX ORDER — ONDANSETRON 8 MG/1
4 TABLET, FILM COATED ORAL ONCE
Qty: 0 | Refills: 0 | Status: COMPLETED | OUTPATIENT
Start: 2017-05-28 | End: 2017-05-28

## 2017-05-28 RX ORDER — ASCORBIC ACID 60 MG
500 TABLET,CHEWABLE ORAL
Qty: 0 | Refills: 0 | Status: DISCONTINUED | OUTPATIENT
Start: 2017-05-28 | End: 2017-05-31

## 2017-05-28 RX ORDER — OXYCODONE HYDROCHLORIDE 5 MG/1
10 TABLET ORAL EVERY 4 HOURS
Qty: 0 | Refills: 0 | Status: DISCONTINUED | OUTPATIENT
Start: 2017-05-28 | End: 2017-05-31

## 2017-05-28 RX ORDER — NALOXONE HYDROCHLORIDE 4 MG/.1ML
0.1 SPRAY NASAL
Qty: 0 | Refills: 0 | Status: DISCONTINUED | OUTPATIENT
Start: 2017-05-28 | End: 2017-05-31

## 2017-05-28 RX ORDER — HYDROMORPHONE HYDROCHLORIDE 2 MG/ML
30 INJECTION INTRAMUSCULAR; INTRAVENOUS; SUBCUTANEOUS
Qty: 0 | Refills: 0 | Status: DISCONTINUED | OUTPATIENT
Start: 2017-05-28 | End: 2017-05-29

## 2017-05-28 RX ORDER — SODIUM CHLORIDE 9 MG/ML
1000 INJECTION INTRAMUSCULAR; INTRAVENOUS; SUBCUTANEOUS
Qty: 0 | Refills: 0 | Status: DISCONTINUED | OUTPATIENT
Start: 2017-05-28 | End: 2017-05-31

## 2017-05-28 RX ORDER — FOLIC ACID 0.8 MG
1 TABLET ORAL DAILY
Qty: 0 | Refills: 0 | Status: DISCONTINUED | OUTPATIENT
Start: 2017-05-28 | End: 2017-05-31

## 2017-05-28 RX ORDER — ACETAMINOPHEN 500 MG
1000 TABLET ORAL ONCE
Qty: 0 | Refills: 0 | Status: COMPLETED | OUTPATIENT
Start: 2017-05-28 | End: 2017-05-28

## 2017-05-28 RX ORDER — HYDROMORPHONE HYDROCHLORIDE 2 MG/ML
0.5 INJECTION INTRAMUSCULAR; INTRAVENOUS; SUBCUTANEOUS
Qty: 0 | Refills: 0 | Status: DISCONTINUED | OUTPATIENT
Start: 2017-05-28 | End: 2017-05-29

## 2017-05-28 RX ORDER — DIPHENHYDRAMINE HCL 50 MG
25 CAPSULE ORAL AT BEDTIME
Qty: 0 | Refills: 0 | Status: DISCONTINUED | OUTPATIENT
Start: 2017-05-28 | End: 2017-05-31

## 2017-05-28 RX ORDER — CEFAZOLIN SODIUM 1 G
2000 VIAL (EA) INJECTION EVERY 8 HOURS
Qty: 0 | Refills: 0 | Status: COMPLETED | OUTPATIENT
Start: 2017-05-28 | End: 2017-05-29

## 2017-05-28 RX ORDER — FERROUS SULFATE 325(65) MG
325 TABLET ORAL
Qty: 0 | Refills: 0 | Status: DISCONTINUED | OUTPATIENT
Start: 2017-05-28 | End: 2017-05-31

## 2017-05-28 RX ORDER — ACETAMINOPHEN 500 MG
650 TABLET ORAL EVERY 6 HOURS
Qty: 0 | Refills: 0 | Status: DISCONTINUED | OUTPATIENT
Start: 2017-05-28 | End: 2017-05-31

## 2017-05-28 RX ADMIN — Medication 400 MILLIGRAM(S): at 16:49

## 2017-05-28 RX ADMIN — Medication 100 MILLIGRAM(S): at 21:15

## 2017-05-28 RX ADMIN — HYDROMORPHONE HYDROCHLORIDE 30 MILLILITER(S): 2 INJECTION INTRAMUSCULAR; INTRAVENOUS; SUBCUTANEOUS at 16:50

## 2017-05-28 RX ADMIN — Medication 15 MILLIGRAM(S): at 05:37

## 2017-05-28 RX ADMIN — SODIUM CHLORIDE 75 MILLILITER(S): 9 INJECTION INTRAMUSCULAR; INTRAVENOUS; SUBCUTANEOUS at 17:28

## 2017-05-28 RX ADMIN — ONDANSETRON 4 MILLIGRAM(S): 8 TABLET, FILM COATED ORAL at 17:29

## 2017-05-28 RX ADMIN — Medication 100 MILLIGRAM(S): at 20:17

## 2017-05-28 RX ADMIN — Medication 15 MILLIGRAM(S): at 16:20

## 2017-05-28 RX ADMIN — Medication 15 MILLIGRAM(S): at 11:54

## 2017-05-28 NOTE — PROGRESS NOTE ADULT - ASSESSMENT
A/P: 71F with Right Tibial Plateau Fracture    1. Pain control  2. NWB RLE in KI  3. NPO/IVF  4. Hold anticoagulation  5. FU labs  6. Medical management  7. OR today with Dr. Rondon

## 2017-05-28 NOTE — PROGRESS NOTE ADULT - SUBJECTIVE AND OBJECTIVE BOX
Pt S&E. Pain controlled. Tolerated procedure well.   AVSS  Gen: NAD  Right Lower Extremity:  Dsg c/d/i  SILT L2-S1  +EHL/FHL/TA/Gastroc  DP+  Soft compartments, - calf ttp

## 2017-05-28 NOTE — PROGRESS NOTE ADULT - SUBJECTIVE AND OBJECTIVE BOX
CC- s/p mechanical fall    HPI:  70 yo/female, denies any past medical history other than anxiety and depression for which she is medicated, s/p bilateral total hips done at [facility] by Ti, and L ACL Recon done in the 80's. Here today s/p fall around 4 pm off a ladder at aprroximatly 4 feet above the ground. experienced an axial load through her foot and had immediate pain in her right knee. she was unable to ambulate and was brought to [facility] where xr revealed a bicondylar fx of her tibial plateau. She denies HS or LOC, She denies any other pain. (27 May 2017 00:34)  Medical consult called for postop medical management. Patient normally is very active, does not have chest pain or SOB on ambulation    PMH- as above  PSH- b/l THR  Soc hx- denies smoking, alcohol- socially  Fam hx- no cardiac diseases     5/28/17- doing good. For OR    Vital Signs Last 24 Hrs  T(C): 37.1, Max: 37.3 (05-28 @ 05:33)  T(F): 98.8, Max: 99.2 (05-28 @ 05:33)  HR: 77 (77 - 78)  BP: 122/55 (121/64 - 122/55)  BP(mean): --  RR: 18 (18 - 18)  SpO2: 100% (97% - 100%)    LABS:                                   11.3   7.4   )-----------( 164      ( 28 May 2017 04:45 )             33.9     28 May 2017 04:45    139    |  107    |  10     ----------------------------<  103    4.1     |  27     |  0.83     Ca    8.1        28 May 2017 04:45  Phos  2.8       28 May 2017 04:45  Mg     2.1       28 May 2017 04:45    TPro  6.6    /  Alb  3.7    /  TBili  0.4    /  DBili  x      /  AST  22     /  ALT  29     /  AlkPhos  52     26 May 2017 21:05    LIVER FUNCTIONS - ( 26 May 2017 21:05 )  Alb: 3.7 g/dL / Pro: 6.6 gm/dL / ALK PHOS: 52 U/L / ALT: 29 U/L / AST: 22 U/L / GGT: x           PT/INR - ( 27 May 2017 10:00 )   PT: 11.0 sec;   INR: 1.02 ratio    PTT - ( 27 May 2017 10:00 )  PTT:30.0 sec    RADIOLOGY & ADDITIONAL TESTS:  EXAM:  XR KNEE 2 VIEWS RT                        PROCEDURE DATE:  05/27/2017      INTERPRETATION:  Exam Date: 5/27/2017 3:17 AM  Clinical Information: Fall with right lower extremity pain  Technique: Single view of the pelvis , 2 views of theright hip, 2 views   of the left hip, 4 views of the right femur, 3 views of the right knee, 4   views of the right leg and 3 post reduction views of the right knee     Findings:    Prior bilateral total hip replacements. Degenerative changes of the   pelvis partially visualized lumbar sacral spine. No definite pelvic or   bilateral hip fracture. No right femur fracture. Moderate right   suprapatellar lipohemarthrosis.  Comminuted displaced impacted right   posterior and medial tibial plateau fracture extending into the proximal   tibia. Tibiofemoral subluxation. Partially visualized ankle mortise   appears intact. No definite fibular fracture.    Post reduction imaging demonstrates persistent comminuted displaced   intra-articular tibial plateau fracture. Additional subluxation of the   tibiofemoral joint persists.    Impression:  Comminuted displaced impacted intra-articular fracture of the right   medial posterior tibial plateau extending into the proximal tibia with   associated lipohemarthrosis. Additional subluxation of the tibiofemoral   joint    PHYSICAL EXAM:  GENERAL: NAD, well-groomed, well-developed  HEAD:  Atraumatic, Normocephalic  EYES: EOMI, PERRLA, conjunctiva and sclera clear  HEENT: Moist mucous membranes  NECK: Supple, No JVD  NERVOUS SYSTEM:  Alert & Oriented X3, Motor Strength 5/5 B/L upper and lower extremities; DTRs 2+ intact and symmetric  CHEST/LUNG: Clear to auscultation bilaterally; No rales, rhonchi, wheezing, or rubs  HEART: Regular rate and rhythm; No murmurs, rubs, or gallops  ABDOMEN: Soft, Nontender, Nondistended; Bowel sounds present  GENITOURINARY- Voiding, no palpable bladder  EXTREMITIES:  2+ Peripheral Pulses, No clubbing, cyanosis, or edema  MUSCULOSKELTAL- Rt leg in immobilizer  SKIN-no rash, no lesion  CNS- alert, oriented X3, non focal     Daily Height in cm: 162.56 (26 May 2017 19:27)        sodium chloride 0.9%. 1000milliLiter(s) IV Continuous <Continuous>  oxyCODONE IR 10milliGRAM(s) Oral every 4 hours PRN  oxyCODONE IR 5milliGRAM(s) Oral every 4 hours PRN  metoclopramide Injectable 10milliGRAM(s) IV Push every 6 hours PRN  magnesium hydroxide Suspension 30milliLiter(s) Oral daily PRN  diphenhydrAMINE   Capsule 25milliGRAM(s) Oral at bedtime PRN  ferrous    sulfate 325milliGRAM(s) Oral three times a day with meals  folic acid 1milliGRAM(s) Oral daily  multivitamin 1Tablet(s) Oral daily  ascorbic acid 500milliGRAM(s) Oral two times a day  sertraline 100milliGRAM(s) Oral daily  heparin  Injectable 5000Unit(s) SubCutaneous every 8 hours  docusate sodium 200milliGRAM(s) Oral daily  acetaminophen  Suppository 650milliGRAM(s) Rectal every 6 hours PRN  ketorolac   Injectable 15milliGRAM(s) IV Push every 6 hours  HYDROmorphone  Injectable 1milliGRAM(s) IV Push every 3 hours PRN    Assessment/Plan  #RT tibial plateau fracture s/p mechanical fall  Ortho eval appreciated  Bedrest, Pain meds prn  Pt is medically optimized for OR    #Dispo- thank you for consult, will follow with you. Patient is medically optimized for OR

## 2017-05-28 NOTE — PROGRESS NOTE ADULT - SUBJECTIVE AND OBJECTIVE BOX
Pt S/E at bedside, no acute events overnight, pain controlled    AVSS  Gen: NAD, AAOx3    Right Lower Extremity:  skin intact, in KI  +EHL/FHL/TA/GS  SILT L3-S1  +DP/PT Pulses  Compartments soft  No calf TTP B/L

## 2017-05-29 LAB
ANION GAP SERPL CALC-SCNC: 8 MMOL/L — SIGNIFICANT CHANGE UP (ref 5–17)
BASOPHILS # BLD AUTO: 0.1 K/UL — SIGNIFICANT CHANGE UP (ref 0–0.2)
BASOPHILS NFR BLD AUTO: 0.6 % — SIGNIFICANT CHANGE UP (ref 0–2)
BUN SERPL-MCNC: 9 MG/DL — SIGNIFICANT CHANGE UP (ref 7–23)
CALCIUM SERPL-MCNC: 7.8 MG/DL — LOW (ref 8.5–10.1)
CHLORIDE SERPL-SCNC: 106 MMOL/L — SIGNIFICANT CHANGE UP (ref 96–108)
CO2 SERPL-SCNC: 26 MMOL/L — SIGNIFICANT CHANGE UP (ref 22–31)
CREAT SERPL-MCNC: 0.77 MG/DL — SIGNIFICANT CHANGE UP (ref 0.5–1.3)
EOSINOPHIL # BLD AUTO: 0.1 K/UL — SIGNIFICANT CHANGE UP (ref 0–0.5)
EOSINOPHIL NFR BLD AUTO: 1 % — SIGNIFICANT CHANGE UP (ref 0–6)
GLUCOSE SERPL-MCNC: 90 MG/DL — SIGNIFICANT CHANGE UP (ref 70–99)
HCT VFR BLD CALC: 30.7 % — LOW (ref 34.5–45)
HGB BLD-MCNC: 10.2 G/DL — LOW (ref 11.5–15.5)
LYMPHOCYTES # BLD AUTO: 1.8 K/UL — SIGNIFICANT CHANGE UP (ref 1–3.3)
LYMPHOCYTES # BLD AUTO: 20.5 % — SIGNIFICANT CHANGE UP (ref 13–44)
MCHC RBC-ENTMCNC: 32.4 PG — SIGNIFICANT CHANGE UP (ref 27–34)
MCHC RBC-ENTMCNC: 33.2 GM/DL — SIGNIFICANT CHANGE UP (ref 32–36)
MCV RBC AUTO: 97.6 FL — SIGNIFICANT CHANGE UP (ref 80–100)
MONOCYTES # BLD AUTO: 0.8 K/UL — SIGNIFICANT CHANGE UP (ref 0–0.9)
MONOCYTES NFR BLD AUTO: 8.9 % — SIGNIFICANT CHANGE UP (ref 2–14)
NEUTROPHILS # BLD AUTO: 6 K/UL — SIGNIFICANT CHANGE UP (ref 1.8–7.4)
NEUTROPHILS NFR BLD AUTO: 69 % — SIGNIFICANT CHANGE UP (ref 43–77)
PLATELET # BLD AUTO: 172 K/UL — SIGNIFICANT CHANGE UP (ref 150–400)
POTASSIUM SERPL-MCNC: 3.8 MMOL/L — SIGNIFICANT CHANGE UP (ref 3.5–5.3)
POTASSIUM SERPL-SCNC: 3.8 MMOL/L — SIGNIFICANT CHANGE UP (ref 3.5–5.3)
RBC # BLD: 3.15 M/UL — LOW (ref 3.8–5.2)
RBC # FLD: 12.3 % — SIGNIFICANT CHANGE UP (ref 10.3–14.5)
SODIUM SERPL-SCNC: 140 MMOL/L — SIGNIFICANT CHANGE UP (ref 135–145)
WBC # BLD: 8.7 K/UL — SIGNIFICANT CHANGE UP (ref 3.8–10.5)
WBC # FLD AUTO: 8.7 K/UL — SIGNIFICANT CHANGE UP (ref 3.8–10.5)

## 2017-05-29 PROCEDURE — 99223 1ST HOSP IP/OBS HIGH 75: CPT

## 2017-05-29 RX ORDER — HYDROMORPHONE HYDROCHLORIDE 2 MG/ML
1 INJECTION INTRAMUSCULAR; INTRAVENOUS; SUBCUTANEOUS
Qty: 0 | Refills: 0 | Status: DISCONTINUED | OUTPATIENT
Start: 2017-05-29 | End: 2017-05-31

## 2017-05-29 RX ORDER — ENOXAPARIN SODIUM 100 MG/ML
40 INJECTION SUBCUTANEOUS DAILY
Qty: 0 | Refills: 0 | Status: DISCONTINUED | OUTPATIENT
Start: 2017-05-29 | End: 2017-05-31

## 2017-05-29 RX ORDER — ENOXAPARIN SODIUM 100 MG/ML
40 INJECTION SUBCUTANEOUS
Qty: 30 | Refills: 0
Start: 2017-05-29 | End: 2017-06-28

## 2017-05-29 RX ADMIN — Medication 325 MILLIGRAM(S): at 09:00

## 2017-05-29 RX ADMIN — HYDROMORPHONE HYDROCHLORIDE 1 MILLIGRAM(S): 2 INJECTION INTRAMUSCULAR; INTRAVENOUS; SUBCUTANEOUS at 21:33

## 2017-05-29 RX ADMIN — Medication 100 MILLIGRAM(S): at 04:32

## 2017-05-29 RX ADMIN — Medication 500 MILLIGRAM(S): at 05:09

## 2017-05-29 RX ADMIN — HYDROMORPHONE HYDROCHLORIDE 1 MILLIGRAM(S): 2 INJECTION INTRAMUSCULAR; INTRAVENOUS; SUBCUTANEOUS at 18:34

## 2017-05-29 RX ADMIN — Medication 100 MILLIGRAM(S): at 05:09

## 2017-05-29 RX ADMIN — ENOXAPARIN SODIUM 30 MILLIGRAM(S): 100 INJECTION SUBCUTANEOUS at 05:09

## 2017-05-29 RX ADMIN — Medication 100 MILLIGRAM(S): at 21:36

## 2017-05-29 RX ADMIN — SODIUM CHLORIDE 75 MILLILITER(S): 9 INJECTION INTRAMUSCULAR; INTRAVENOUS; SUBCUTANEOUS at 04:32

## 2017-05-29 RX ADMIN — Medication 650 MILLIGRAM(S): at 21:33

## 2017-05-29 RX ADMIN — Medication 100 MILLIGRAM(S): at 13:56

## 2017-05-29 RX ADMIN — HYDROMORPHONE HYDROCHLORIDE 1 MILLIGRAM(S): 2 INJECTION INTRAMUSCULAR; INTRAVENOUS; SUBCUTANEOUS at 18:04

## 2017-05-29 RX ADMIN — Medication 500 MILLIGRAM(S): at 17:24

## 2017-05-29 RX ADMIN — Medication 1 MILLIGRAM(S): at 12:37

## 2017-05-29 RX ADMIN — Medication 325 MILLIGRAM(S): at 12:37

## 2017-05-29 RX ADMIN — SODIUM CHLORIDE 75 MILLILITER(S): 9 INJECTION INTRAMUSCULAR; INTRAVENOUS; SUBCUTANEOUS at 17:24

## 2017-05-29 RX ADMIN — Medication 1 TABLET(S): at 12:37

## 2017-05-29 RX ADMIN — Medication 325 MILLIGRAM(S): at 17:24

## 2017-05-29 NOTE — PROGRESS NOTE ADULT - SUBJECTIVE AND OBJECTIVE BOX
Pt S&E. Pain controlled. No acute events overnight. Complains of subjective parathesias RLE.    Vital Signs Last 24 Hrs  T(C): 37.1, Max: 37.6 (05-28 @ 16:38)  T(F): 98.7, Max: 99.7 (05-28 @ 16:38)  HR: 67 (60 - 81)  BP: 126/58 (119/59 - 130/65)  BP(mean): --  RR: 16 (14 - 19)  SpO2: 99% (98% - 100%)    Gen: NAD  Right Lower Extremity:  Dsg c/d/i  SILT L2-S1  +EHL/FHL/TA/Gastroc  DP+  Soft compartments, - calf ttp

## 2017-05-29 NOTE — DISCHARGE NOTE ADULT - CARE PROVIDER_API CALL
Mateo Rondon (MD), Orthopaedic Surgery  [address] [place] [address]  Phone: [phone]  Fax: [phone]

## 2017-05-29 NOTE — PHYSICAL THERAPY INITIAL EVALUATION ADULT - LIVES WITH, PROFILE
2 story home with 3 VINCE b/l rails, pt has bedroom and full bathroom with bathtub shower on ground level./spouse

## 2017-05-29 NOTE — DISCHARGE NOTE ADULT - PATIENT PORTAL LINK FT
“You can access the [facility] Patient Portal, offered by Health system, by registering with the following website: [email or web address]”

## 2017-05-29 NOTE — DISCHARGE NOTE ADULT - MEDICATION SUMMARY - MEDICATIONS TO TAKE
I will START or STAY ON the medications listed below when I get home from the hospital:  None I will START or STAY ON the medications listed below when I get home from the hospital:    oxyCODONE-acetaminophen 5 mg-325 mg oral tablet  -- 1 tab(s) by mouth every 4 hours, As Needed -for severe pain MDD:6  -- Caution federal law prohibits the transfer of this drug to any person other  than the person for whom it was prescribed.  May cause drowsiness.  Alcohol may intensify this effect.  Use care when operating dangerous machinery.  This prescription cannot be refilled.  This product contains acetaminophen.  Do not use  with any other product containing acetaminophen to prevent possible liver damage.  Using more of this medication than prescribed may cause serious breathing problems.    -- Indication: For pain    Lovenox 40 mg/0.4 mL injectable solution  -- 40 milligram(s) injectable once a day MDD:40mg  take as directed by anticoagualtion services  -- It is very important that you take or use this exactly as directed.  Do not skip doses or discontinue unless directed by your doctor.    -- Indication: For dvt ppx    Colace 100 mg oral capsule  -- 1 cap(s) by mouth 3 times a day for constipation  -- Medication should be taken with plenty of water.    -- Indication: For constipation I will START or STAY ON the medications listed below when I get home from the hospital:    oxyCODONE-acetaminophen 5 mg-325 mg oral tablet  -- 1 tab(s) by mouth every 4 hours, As Needed -for severe pain MDD:6  -- Caution federal law prohibits the transfer of this drug to any person other  than the person for whom it was prescribed.  May cause drowsiness.  Alcohol may intensify this effect.  Use care when operating dangerous machinery.  This prescription cannot be refilled.  This product contains acetaminophen.  Do not use  with any other product containing acetaminophen to prevent possible liver damage.  Using more of this medication than prescribed may cause serious breathing problems.    -- Indication: For pain    Lovenox 40 mg/0.4 mL injectable solution  -- 40 milligram(s) injectable once a day MDD:40mg  take as directed by anticoagualtion services  -- It is very important that you take or use this exactly as directed.  Do not skip doses or discontinue unless directed by your doctor.    -- Indication: For dvt ppx    sertraline 100 mg oral tablet  -- 1 tab(s) by mouth once a day  -- Indication: For Home med    Colace 100 mg oral capsule  -- 1 cap(s) by mouth 3 times a day for constipation  -- Medication should be taken with plenty of water.    -- Indication: For constipation

## 2017-05-29 NOTE — PROGRESS NOTE ADULT - SUBJECTIVE AND OBJECTIVE BOX
Patient seen and examined earlier today. Was about to work with physical therapy. pain controlled with pca pump. No sob/chest pain. hadn't been out of bed yet.     Review of system- All 10 systems reviewed and is as per HPI otherwise negative.           T(C): 37.1, Max: 37.6 (05-28 @ 16:38)  HR: 76 (60 - 81)  BP: 118/76 (118/76 - 130/65)  RR: 16 (14 - 19)  SpO2: 99% (98% - 100%)      PHYSICAL EXAM:    GENERAL: Comfortable, no acute distress  HEAD:  Atraumatic, Normocephalic  EYES: EOMI, PERRLA  HEENT: Moist mucous membranes  NECK: Supple, No JVD  NERVOUS SYSTEM:  Alert & Oriented X3, nonfocal  CHEST/LUNG: Clear to auscultation bilaterally  HEART: Regular rate and rhythm; No murmurs, rubs, or gallops  ABDOMEN: Soft, Nontender, Nondistended; Bowel sounds present  GENITOURINARY- Voiding, no palpable bladder  EXTREMITIES:  RLE in immobilizer  MUSCULOSKELTAL- No muscle tenderness, No joint tenderness  SKIN-no rash        LABS:                        10.2   8.7   )-----------( 172      ( 29 May 2017 07:21 )             30.7     05-29    140  |  106  |  9   ----------------------------<  90  3.8   |  26  |  0.77    Ca    7.8<L>      29 May 2017 07:21  Phos  2.8     05-28  Mg     2.1     05-28    MEDS  HYDROmorphone PCA (1 mG/mL) 30milliLiter(s) PCA Continuous PCA Continuous  HYDROmorphone PCA (1 mG/mL) Rescue Clinician Bolus 0.5milliGRAM(s) IV Push every 15 minutes PRN  naloxone Injectable 0.1milliGRAM(s) IV Push every 3 minutes PRN  ondansetron Injectable 4milliGRAM(s) IV Push every 6 hours PRN  sodium chloride 0.9%. 1000milliLiter(s) IV Continuous <Continuous>  acetaminophen   Tablet 650milliGRAM(s) Oral every 6 hours PRN  oxyCODONE IR 10milliGRAM(s) Oral every 4 hours PRN  oxyCODONE IR 5milliGRAM(s) Oral every 4 hours PRN  HYDROmorphone  Injectable 0.5milliGRAM(s) IV Push every 4 hours PRN  ondansetron Injectable 4milliGRAM(s) IV Push every 6 hours PRN  docusate sodium 100milliGRAM(s) Oral three times a day  diphenhydrAMINE   Capsule 25milliGRAM(s) Oral at bedtime PRN  ferrous    sulfate 325milliGRAM(s) Oral three times a day with meals  folic acid 1milliGRAM(s) Oral daily  multivitamin 1Tablet(s) Oral daily  ascorbic acid 500milliGRAM(s) Oral two times a day  enoxaparin Injectable 40milliGRAM(s) SubCutaneous daily

## 2017-05-29 NOTE — DISCHARGE NOTE ADULT - HOSPITAL COURSE
The patient is a 71 year old female status post Open Reduction Surgical Fixation of a right tibial plateau Fracture after being admitted through [facility] Emergency Room. The Patient was medically Optimized for the Previously mentioned surgical procedure. The patient was taken to the operating room on date mentioned above. Prophylactic antibiotics were started before the procedure and continued for 24 hours.  There were no complications during the procedure and patient tolerated the procedure well.  The patient was transferred to recovery room in stable condition and subsequently to surgical floor.  Patient was placed on Lovenox for anticoagulation.  All home medications were continued.  The patient received physical therapy daily and daily labs were followed.  The dressing was kept clean, dry, intact.  The rest of the hospital stay was unremarkable. The patient was discharged in stable condition to follow up as outpatient.

## 2017-05-29 NOTE — CONSULT NOTE ADULT - SUBJECTIVE AND OBJECTIVE BOX
HPI:  71 female, denies any past medical history other than anxiety and depression for which she is medicated, s/p bilateral total hips done at [facility] by [facility], and L ACL Recon done in the 80's. Here today s/p fall around 4 pm off a ladder at aprroximatly 4 feet above the ground. experienced an axial load through her foot and had immediate pain in her right knee. she was unable to ambulate and was brought to [facility] where xr revealed a bicondylar fx of her tibial plateau. She denies HS or LOC, She denies any other pain. (27 May 2017 00:34)      Patient is a 71y old  Female who presents with a chief complaint of Right tibial plateau fx (29 May 2017 09:12)  now s/p right tibial plateau ORIF on 5-28-17.      Consulted by Dr. Mateo Rondon for VTE prophylaxis, risk stratification, and anticoagulation management.    PAST MEDICAL & SURGICAL HISTORY:  No pertinent past medical history  History of bilateral hip replacements          Caprini VTE Risk Score: 7 high    IMPROVE Bleeding Risk Score:1.5 low  PAIN;*8/10 DOSE NOT WANT ANY ANY MEDS AT THE PRESENT TIME  Falls Risk:   High (  )  Mod (x  )  Low (  )    EBL:  100 ml  crcl: 61.8  5-29-17 pr seen at bedside on 2sw,  present.  Discussed her anticoagulation prophylaxis with Lovenox for 3-4 weeks depending on mobility.  Questions answered will reinforce as needed.  Vital Signs Last 24 Hrs  T(C): 37.1, Max: 37.6 (05-28 @ 16:38)  T(F): 98.7, Max: 99.7 (05-28 @ 16:38)  HR: 67 (60 - 81)  BP: 126/58 (119/59 - 130/65)  BP(mean): --  RR: 16 (14 - 19)  SpO2: 99% (98% - 100%)  FAMILY HISTORY:  No pertinent family history in first degree relatives    Denies any personal or familial history of clotting or bleeding disorders.    Allergies    No Known Allergies    Intolerances        REVIEW OF SYSTEMS    (  )Fever	     (  )Constipation	(  )SOB				(  )Headache	(  )Dysuria  (  )Chills	     (  )Melena	(  )Dyspnea present on exertion	                    (  )Dizziness                    (  )Polyuria  (  )Nausea	     (  )Hematochezia	(  )Cough			                    (  )Syncope   	(  )Hematuria  (  )Vomiting    (  )Chest Pain	(  )Wheezing			(  )Weakness  (  )Diarrhea     (  )Palpitations	(  )Anorexia			(  )Myalgia    All other review of systems negative: Yes      PHYSICAL EXAM:    Constitutional: Appears Well    Neurological: A& O x 3    Skin: Warm    Respiratory and Thorax: normal effort; Breath sounds: normal; No rales/wheezing/rhonchi  	  Cardiovascular: S1, S2, regular, NMBR	    Gastrointestinal: BS + x 4Q, nontender	    Genitourinary:  Bladder nondistended, nontender    Musculoskeletal:   General Right:   no muscle/joint tenderness,   normal tone, no joint swelling, + LE EDEMA 2-3 +  ROM: limited	    General Left:   no muscle/joint tenderness,   normal tone, no joint swelling,   ROM: full      Knee:  Right: Incision: ; Dressing CDI; Knee immobilizer noted               Lower extrems:   Right: no calf tenderness              negative bridget's sign               + pedal pulses    Left:   no calf tenderness              negative bridget's sign               + pedal pulses                          10.2   8.7   )-----------( 172      ( 29 May 2017 07:21 )             30.7       05-29    140  |  106  |  9   ----------------------------<  90  3.8   |  26  |  0.77    Ca    7.8<L>      29 May 2017 07:21  Phos  2.8     05-28  Mg     2.1     05-28        				    MEDICATIONS  (STANDING):  HYDROmorphone PCA (1 mG/mL) 30milliLiter(s) PCA Continuous PCA Continuous  sodium chloride 0.9%. 1000milliLiter(s) IV Continuous <Continuous>  docusate sodium 100milliGRAM(s) Oral three times a day  ferrous    sulfate 325milliGRAM(s) Oral three times a day with meals  folic acid 1milliGRAM(s) Oral daily  multivitamin 1Tablet(s) Oral daily  ascorbic acid 500milliGRAM(s) Oral two times a day  enoxaparin Injectable 40milliGRAM(s) SubCutaneous daily          DVT Prophylaxis:  LMWH                   (  )  Heparin SQ           (x  )  Coumadin             (  )  Xarelto                  (  )  Eliquis                   (  )  Venodynes           ( x )  Ambulation          (x)  UFH                       (  )  Contraindicated  (  )

## 2017-05-29 NOTE — CONSULT NOTE ADULT - ASSESSMENT
This is a 71 year old female s/p fall off ladder on 5-26-17 causing fx of her right tibial Plateau, now s/p right tibial plateau ORIF on 5-28-17. Pt has high thrombosis risk.  : pt is non weight bearing on left leg   Lovenox 40mg sq daily for 3-4 weeks depending on mobility. Pt is willing to learn self inj because she will be gong home will have RN teaching  :daily cbc/bmp  :le Venodynes  mobility as per ortho  Thanks for consult will f/u

## 2017-05-29 NOTE — DISCHARGE NOTE ADULT - CARE PLAN
Principal Discharge DX:	Tibial plateau fracture, right, closed, initial encounter  Goal:	Return to ADLs  Instructions for follow-up, activity and diet:	1.	Pain Control  2.	Non-Weight Bearing Right Lower Extremity, with assistive devices as needed  3.	DVT Prophylaxis  4.	PT as needed  5.	Follow up with Dr. Rondon as Outpatient in 10-14 Days after Discharge from the Hospital or Rehab. Call Office For Appointment.  6.	 Staples/Sutures to be removed  Post-Op Day 14, and repeat x-rays  7.	Ice/Elevate affected area as Needed  8.	Keep Dressing Clean and dry.

## 2017-05-29 NOTE — DISCHARGE NOTE ADULT - PLAN OF CARE
Return to ADLs 1.	Pain Control  2.	Non-Weight Bearing Right Lower Extremity, with assistive devices as needed  3.	DVT Prophylaxis  4.	PT as needed  5.	Follow up with Dr. Rondon as Outpatient in 10-14 Days after Discharge from the Hospital or Rehab. Call Office For Appointment.  6.	 Staples/Sutures to be removed  Post-Op Day 14, and repeat x-rays  7.	Ice/Elevate affected area as Needed  8.	Keep Dressing Clean and dry.

## 2017-05-29 NOTE — PROGRESS NOTE ADULT - ASSESSMENT
71F, pmh of anxiety, depression, b/l Hip replacement, L ACL reconstruction, presented to the ER with fall from ladder. Found to have bicondylar fx of tibial plateau.     1. Tibial plataeu fx:  -s/p ORIF POD#1  -pain control  -physical therapy  -incentive spirometry    2. Anxiety/depression:  -continue home meds    3. DVT px  -lovenox

## 2017-05-29 NOTE — PHYSICAL THERAPY INITIAL EVALUATION ADULT - PERTINENT HX OF CURRENT PROBLEM, REHAB EVAL
70 yo/female, denies any past medical history other than anxiety and depression for which she is medicated, s/p bilateral total hips done at [facility] by Ti, and L ACL Recon done in the 80's. Here today s/p fall around 4 pm off a ladder at aprroximatly 4 feet above the ground. experienced an axial load through her foot and had immediate pain in her right knee. she was unable to ambulate and was brought to [facility] where xr revealed a bicondylar fx of her tibial plateau.

## 2017-05-30 LAB
ANION GAP SERPL CALC-SCNC: 7 MMOL/L — SIGNIFICANT CHANGE UP (ref 5–17)
BASOPHILS # BLD AUTO: 0.1 K/UL — SIGNIFICANT CHANGE UP (ref 0–0.2)
BASOPHILS NFR BLD AUTO: 0.9 % — SIGNIFICANT CHANGE UP (ref 0–2)
BUN SERPL-MCNC: 9 MG/DL — SIGNIFICANT CHANGE UP (ref 7–23)
CALCIUM SERPL-MCNC: 8.2 MG/DL — LOW (ref 8.5–10.1)
CHLORIDE SERPL-SCNC: 106 MMOL/L — SIGNIFICANT CHANGE UP (ref 96–108)
CO2 SERPL-SCNC: 28 MMOL/L — SIGNIFICANT CHANGE UP (ref 22–31)
CREAT SERPL-MCNC: 0.62 MG/DL — SIGNIFICANT CHANGE UP (ref 0.5–1.3)
EOSINOPHIL # BLD AUTO: 0.1 K/UL — SIGNIFICANT CHANGE UP (ref 0–0.5)
EOSINOPHIL NFR BLD AUTO: 1.3 % — SIGNIFICANT CHANGE UP (ref 0–6)
GLUCOSE SERPL-MCNC: 100 MG/DL — HIGH (ref 70–99)
HCT VFR BLD CALC: 29.1 % — LOW (ref 34.5–45)
HGB BLD-MCNC: 9.5 G/DL — LOW (ref 11.5–15.5)
LYMPHOCYTES # BLD AUTO: 1.3 K/UL — SIGNIFICANT CHANGE UP (ref 1–3.3)
LYMPHOCYTES # BLD AUTO: 18.2 % — SIGNIFICANT CHANGE UP (ref 13–44)
MAGNESIUM SERPL-MCNC: 2 MG/DL — SIGNIFICANT CHANGE UP (ref 1.6–2.6)
MCHC RBC-ENTMCNC: 32.4 PG — SIGNIFICANT CHANGE UP (ref 27–34)
MCHC RBC-ENTMCNC: 32.8 GM/DL — SIGNIFICANT CHANGE UP (ref 32–36)
MCV RBC AUTO: 98.7 FL — SIGNIFICANT CHANGE UP (ref 80–100)
MONOCYTES # BLD AUTO: 0.6 K/UL — SIGNIFICANT CHANGE UP (ref 0–0.9)
MONOCYTES NFR BLD AUTO: 8.7 % — SIGNIFICANT CHANGE UP (ref 2–14)
NEUTROPHILS # BLD AUTO: 5 K/UL — SIGNIFICANT CHANGE UP (ref 1.8–7.4)
NEUTROPHILS NFR BLD AUTO: 70.8 % — SIGNIFICANT CHANGE UP (ref 43–77)
PHOSPHATE SERPL-MCNC: 2 MG/DL — LOW (ref 2.5–4.5)
PLATELET # BLD AUTO: 186 K/UL — SIGNIFICANT CHANGE UP (ref 150–400)
POTASSIUM SERPL-MCNC: 4.1 MMOL/L — SIGNIFICANT CHANGE UP (ref 3.5–5.3)
POTASSIUM SERPL-SCNC: 4.1 MMOL/L — SIGNIFICANT CHANGE UP (ref 3.5–5.3)
RBC # BLD: 2.95 M/UL — LOW (ref 3.8–5.2)
RBC # FLD: 12.1 % — SIGNIFICANT CHANGE UP (ref 10.3–14.5)
SODIUM SERPL-SCNC: 141 MMOL/L — SIGNIFICANT CHANGE UP (ref 135–145)
WBC # BLD: 7 K/UL — SIGNIFICANT CHANGE UP (ref 3.8–10.5)
WBC # FLD AUTO: 7 K/UL — SIGNIFICANT CHANGE UP (ref 3.8–10.5)

## 2017-05-30 PROCEDURE — 99233 SBSQ HOSP IP/OBS HIGH 50: CPT

## 2017-05-30 RX ORDER — SERTRALINE 25 MG/1
1 TABLET, FILM COATED ORAL
Qty: 0 | Refills: 0 | DISCHARGE
Start: 2017-05-30

## 2017-05-30 RX ORDER — DOCUSATE SODIUM 100 MG
1 CAPSULE ORAL
Qty: 60 | Refills: 0
Start: 2017-05-30

## 2017-05-30 RX ORDER — SERTRALINE 25 MG/1
100 TABLET, FILM COATED ORAL DAILY
Qty: 0 | Refills: 0 | Status: DISCONTINUED | OUTPATIENT
Start: 2017-05-30 | End: 2017-05-31

## 2017-05-30 RX ADMIN — OXYCODONE HYDROCHLORIDE 10 MILLIGRAM(S): 5 TABLET ORAL at 21:44

## 2017-05-30 RX ADMIN — OXYCODONE HYDROCHLORIDE 10 MILLIGRAM(S): 5 TABLET ORAL at 15:34

## 2017-05-30 RX ADMIN — HYDROMORPHONE HYDROCHLORIDE 1 MILLIGRAM(S): 2 INJECTION INTRAMUSCULAR; INTRAVENOUS; SUBCUTANEOUS at 09:04

## 2017-05-30 RX ADMIN — ENOXAPARIN SODIUM 40 MILLIGRAM(S): 100 INJECTION SUBCUTANEOUS at 11:48

## 2017-05-30 RX ADMIN — HYDROMORPHONE HYDROCHLORIDE 1 MILLIGRAM(S): 2 INJECTION INTRAMUSCULAR; INTRAVENOUS; SUBCUTANEOUS at 05:16

## 2017-05-30 RX ADMIN — Medication 100 MILLIGRAM(S): at 21:46

## 2017-05-30 RX ADMIN — SERTRALINE 100 MILLIGRAM(S): 25 TABLET, FILM COATED ORAL at 11:36

## 2017-05-30 RX ADMIN — Medication 325 MILLIGRAM(S): at 17:58

## 2017-05-30 RX ADMIN — SODIUM CHLORIDE 75 MILLILITER(S): 9 INJECTION INTRAMUSCULAR; INTRAVENOUS; SUBCUTANEOUS at 18:01

## 2017-05-30 RX ADMIN — OXYCODONE HYDROCHLORIDE 10 MILLIGRAM(S): 5 TABLET ORAL at 11:36

## 2017-05-30 RX ADMIN — HYDROMORPHONE HYDROCHLORIDE 1 MILLIGRAM(S): 2 INJECTION INTRAMUSCULAR; INTRAVENOUS; SUBCUTANEOUS at 01:18

## 2017-05-30 RX ADMIN — Medication 325 MILLIGRAM(S): at 12:00

## 2017-05-30 RX ADMIN — Medication 650 MILLIGRAM(S): at 14:03

## 2017-05-30 RX ADMIN — Medication 325 MILLIGRAM(S): at 08:17

## 2017-05-30 RX ADMIN — Medication 100 MILLIGRAM(S): at 05:22

## 2017-05-30 RX ADMIN — Medication 100 MILLIGRAM(S): at 13:11

## 2017-05-30 RX ADMIN — Medication 1 TABLET(S): at 11:36

## 2017-05-30 RX ADMIN — SODIUM CHLORIDE 75 MILLILITER(S): 9 INJECTION INTRAMUSCULAR; INTRAVENOUS; SUBCUTANEOUS at 07:10

## 2017-05-30 RX ADMIN — HYDROMORPHONE HYDROCHLORIDE 1 MILLIGRAM(S): 2 INJECTION INTRAMUSCULAR; INTRAVENOUS; SUBCUTANEOUS at 13:10

## 2017-05-30 RX ADMIN — Medication 500 MILLIGRAM(S): at 17:58

## 2017-05-30 RX ADMIN — OXYCODONE HYDROCHLORIDE 10 MILLIGRAM(S): 5 TABLET ORAL at 13:10

## 2017-05-30 RX ADMIN — Medication 1 MILLIGRAM(S): at 11:36

## 2017-05-30 RX ADMIN — Medication 500 MILLIGRAM(S): at 05:17

## 2017-05-30 NOTE — PROGRESS NOTE ADULT - SUBJECTIVE AND OBJECTIVE BOX
Pt S&E. Pain controlled. No acute events overnight.    Vital Signs Last 24 Hrs  T(C): 37.2, Max: 38.2 (05-29 @ 22:22)  T(F): 99, Max: 100.7 (05-29 @ 22:22)  HR: 87 (72 - 87)  BP: 106/55 (99/75 - 133/59)  BP(mean): --  RR: 16 (15 - 16)  SpO2: 88% (88% - 100%)    Gen: NAD  Right Lower Extremity:  Dsg c/d/i  SILT L2-S1  +EHL/FHL/TA/Gastroc  DP+  Soft compartments, - calf ttp

## 2017-05-30 NOTE — PROGRESS NOTE ADULT - SUBJECTIVE AND OBJECTIVE BOX
HPI:  71 female, denies any past medical history other than anxiety and depression for which she is medicated, s/p bilateral total hips done at [facility] by [facility], and L ACL Recon done in the 80's. Here today s/p fall around 4 pm off a ladder at aprroximatly 4 feet above the ground. experienced an axial load through her foot and had immediate pain in her right knee. she was unable to ambulate and was brought to [facility] where xr revealed a bicondylar fx of her tibial plateau. She denies HS or LOC, She denies any other pain. (27 May 2017 00:34)      Patient is a 71y old  Female who presents with a chief complaint of Right tibial plateau fx (29 May 2017 09:12)  now s/p right tibial plateau ORIF on 5-28-17.      Consulted by Dr. Mateo Rondon for VTE prophylaxis, risk stratification, and anticoagulation management.    PAST MEDICAL & SURGICAL HISTORY:  No pertinent past medical history  History of bilateral hip replacements      Caprini VTE Risk Score: 7 high    IMPROVE Bleeding Risk Score:1.5 low  PAIN;*8/10 DOSE NOT WANT ANY ANY MEDS AT THE PRESENT TIME  Falls Risk:   High (  )  Mod (x  )  Low (  )    EBL:  100 ml  crcl: 61.8  5-29-17 pr seen at bedside on 2sw,  present.  Discussed her anticoagulation prophylaxis with Lovenox for 3-4 weeks depending on mobility.  Questions answered will reinforce as needed.  5/30/17 pt seen at bedside  present.  Discussed her anticoagulation Lovenox  pt states she was able to self inject today no concerns informed about duration 3-4 weeks depending on mobility.  Pt v/u.   Vital Signs Last 24 Hrs  T(C): 37.1, Max: 37.6 (05-28 @ 16:38)  T(F): 98.7, Max: 99.7 (05-28 @ 16:38)  HR: 67 (60 - 81)  BP: 126/58 (119/59 - 130/65)  BP(mean): --  RR: 16 (14 - 19)  SpO2: 99% (98% - 100%)  FAMILY HISTORY:  No pertinent family history in first degree relatives    Denies any personal or familial history of clotting or bleeding disorders.    Allergies    No Known Allergies    Intolerances        REVIEW OF SYSTEMS    (  )Fever	     (  )Constipation	(  )SOB				(  )Headache	(  )Dysuria  (  )Chills	     (  )Melena	(  )Dyspnea present on exertion	                    (  )Dizziness                    (  )Polyuria  (  )Nausea	     (  )Hematochezia	(  )Cough			                    (  )Syncope   	(  )Hematuria  (  )Vomiting    (  )Chest Pain	(  )Wheezing			(  )Weakness  (  )Diarrhea     (  )Palpitations	(  )Anorexia			(  )Myalgia    All other review of systems negative: Yes      PHYSICAL EXAM:    Constitutional: Appears Well    Neurological: A& O x 3    Skin: Warm    Respiratory and Thorax: normal effort; Breath sounds: normal; No rales/wheezing/rhonchi  	  Cardiovascular: S1, S2, regular, NMBR	    Gastrointestinal: BS + x 4Q, nontender	    Genitourinary:  Bladder nondistended, nontender    Musculoskeletal:   General Right:   no muscle/joint tenderness,   normal tone, no joint swelling, + LE EDEMA 2-3 +  ROM: limited	    General Left:   no muscle/joint tenderness,   normal tone, no joint swelling,   ROM: full      Knee:  Right: Incision: ; Dressing CDI; Knee immobilizer noted               Lower extrems:   Right: no calf tenderness              negative bridget's sign               + pedal pulses    Left:   no calf tenderness              negative bridget's sign               + pedal pulses                          10.2   8.7   )-----------( 172      ( 29 May 2017 07:21 )             30.7       05-29    140  |  106  |  9   ----------------------------<  90  3.8   |  26  |  0.77    Ca    7.8<L>      29 May 2017 07:21  Phos  2.8     05-28  Mg     2.1     05-28        				    MEDICATIONS  (STANDING):  HYDROmorphone PCA (1 mG/mL) 30milliLiter(s) PCA Continuous PCA Continuous  sodium chloride 0.9%. 1000milliLiter(s) IV Continuous <Continuous>  docusate sodium 100milliGRAM(s) Oral three times a day  ferrous    sulfate 325milliGRAM(s) Oral three times a day with meals  folic acid 1milliGRAM(s) Oral daily  multivitamin 1Tablet(s) Oral daily  ascorbic acid 500milliGRAM(s) Oral two times a day  enoxaparin Injectable 40milliGRAM(s) SubCutaneous daily          DVT Prophylaxis:  LMWH                   (  )  Heparin SQ           (x  )  Coumadin             (  )  Xarelto                  (  )  Eliquis                   (  )  Venodynes           ( x )  Ambulation          (x)  UFH                       (  )  Contraindicated  (  )

## 2017-05-30 NOTE — PROGRESS NOTE ADULT - SUBJECTIVE AND OBJECTIVE BOX
pt seen and examined earlier today. Was doing well. No acute overnight events. pain control. Was scheduled for dc today.     Review of system- All 10 systems reviewed and is as per HPI otherwise negative.           T(C): 36.7, Max: 38.2 (05-29 @ 22:22)  HR: 71 (71 - 87)  BP: 123/64 (99/75 - 133/59)  RR: 17 (15 - 17)  SpO2: 99% (88% - 99%)      PHYSICAL EXAM:    GENERAL: Comfortable, no acute distress  HEAD:  Atraumatic, Normocephalic  EYES: EOMI, PERRLA  HEENT: Moist mucous membranes  NECK: Supple, No JVD  NERVOUS SYSTEM:  Alert & Oriented X3, non focal.  CHEST/LUNG: Clear to auscultation bilaterally  HEART: Regular rate and rhythm; No murmurs, rubs, or gallops  ABDOMEN: Soft, Nontender, Nondistended; Bowel sounds present  GENITOURINARY- Voiding, no palpable bladder  EXTREMITIES:  No clubbing, cyanosis, or edema  MUSCULOSKELTAL- RLE dressing intact.   SKIN-no rash        LABS:                        9.5    7.0   )-----------( 186      ( 30 May 2017 07:51 )             29.1     05-30    141  |  106  |  9   ----------------------------<  100<H>  4.1   |  28  |  0.62    Ca    8.2<L>      30 May 2017 07:51  Phos  2.0     05-30  Mg     2.0     05-30          MEDS  naloxone Injectable 0.1milliGRAM(s) IV Push every 3 minutes PRN  ondansetron Injectable 4milliGRAM(s) IV Push every 6 hours PRN  sodium chloride 0.9%. 1000milliLiter(s) IV Continuous <Continuous>  acetaminophen   Tablet 650milliGRAM(s) Oral every 6 hours PRN  oxyCODONE IR 10milliGRAM(s) Oral every 4 hours PRN  oxyCODONE IR 5milliGRAM(s) Oral every 4 hours PRN  ondansetron Injectable 4milliGRAM(s) IV Push every 6 hours PRN  bisacodyl Suppository 10milliGRAM(s) Rectal daily PRN  docusate sodium 100milliGRAM(s) Oral three times a day  diphenhydrAMINE   Capsule 25milliGRAM(s) Oral at bedtime PRN  ferrous    sulfate 325milliGRAM(s) Oral three times a day with meals  folic acid 1milliGRAM(s) Oral daily  multivitamin 1Tablet(s) Oral daily  ascorbic acid 500milliGRAM(s) Oral two times a day  enoxaparin Injectable 40milliGRAM(s) SubCutaneous daily  HYDROmorphone  Injectable 1milliGRAM(s) IV Push every 3 hours PRN  sertraline 100milliGRAM(s) Oral daily

## 2017-05-30 NOTE — PROGRESS NOTE ADULT - ASSESSMENT
71F, pmh of anxiety, depression, b/l Hip replacement, L ACL reconstruction, presented to the ER with fall from ladder. Found to have bicondylar fx of tibial plateau.     1. Tibial plataeu fx:  -s/p ORIF POD#2  -pain control  -physical therapy  -incentive spirometry    2. Anxiety/depression:  -continue home meds    3. DVT px  -lovenox     4. Dispo:  no medical contraindications for dc.

## 2017-05-31 VITALS
TEMPERATURE: 99 F | OXYGEN SATURATION: 97 % | DIASTOLIC BLOOD PRESSURE: 68 MMHG | SYSTOLIC BLOOD PRESSURE: 129 MMHG | HEART RATE: 87 BPM

## 2017-05-31 LAB
ANION GAP SERPL CALC-SCNC: 6 MMOL/L — SIGNIFICANT CHANGE UP (ref 5–17)
BUN SERPL-MCNC: 11 MG/DL — SIGNIFICANT CHANGE UP (ref 7–23)
CALCIUM SERPL-MCNC: 8.3 MG/DL — LOW (ref 8.5–10.1)
CHLORIDE SERPL-SCNC: 105 MMOL/L — SIGNIFICANT CHANGE UP (ref 96–108)
CO2 SERPL-SCNC: 28 MMOL/L — SIGNIFICANT CHANGE UP (ref 22–31)
CREAT SERPL-MCNC: 0.72 MG/DL — SIGNIFICANT CHANGE UP (ref 0.5–1.3)
GLUCOSE SERPL-MCNC: 100 MG/DL — HIGH (ref 70–99)
HCT VFR BLD CALC: 26 % — LOW (ref 34.5–45)
HGB BLD-MCNC: 9 G/DL — LOW (ref 11.5–15.5)
MCHC RBC-ENTMCNC: 33 PG — SIGNIFICANT CHANGE UP (ref 27–34)
MCHC RBC-ENTMCNC: 34.7 GM/DL — SIGNIFICANT CHANGE UP (ref 32–36)
MCV RBC AUTO: 95.3 FL — SIGNIFICANT CHANGE UP (ref 80–100)
PLATELET # BLD AUTO: 199 K/UL — SIGNIFICANT CHANGE UP (ref 150–400)
POTASSIUM SERPL-MCNC: 4.1 MMOL/L — SIGNIFICANT CHANGE UP (ref 3.5–5.3)
POTASSIUM SERPL-SCNC: 4.1 MMOL/L — SIGNIFICANT CHANGE UP (ref 3.5–5.3)
RBC # BLD: 2.72 M/UL — LOW (ref 3.8–5.2)
RBC # FLD: 11.4 % — SIGNIFICANT CHANGE UP (ref 10.3–14.5)
SODIUM SERPL-SCNC: 139 MMOL/L — SIGNIFICANT CHANGE UP (ref 135–145)
WBC # BLD: 7.2 K/UL — SIGNIFICANT CHANGE UP (ref 3.8–10.5)
WBC # FLD AUTO: 7.2 K/UL — SIGNIFICANT CHANGE UP (ref 3.8–10.5)

## 2017-05-31 RX ADMIN — OXYCODONE HYDROCHLORIDE 10 MILLIGRAM(S): 5 TABLET ORAL at 06:57

## 2017-05-31 RX ADMIN — HYDROMORPHONE HYDROCHLORIDE 1 MILLIGRAM(S): 2 INJECTION INTRAMUSCULAR; INTRAVENOUS; SUBCUTANEOUS at 06:57

## 2017-05-31 RX ADMIN — Medication 325 MILLIGRAM(S): at 08:25

## 2017-05-31 RX ADMIN — Medication 100 MILLIGRAM(S): at 05:30

## 2017-05-31 RX ADMIN — Medication 500 MILLIGRAM(S): at 05:30

## 2017-05-31 RX ADMIN — OXYCODONE HYDROCHLORIDE 10 MILLIGRAM(S): 5 TABLET ORAL at 03:55

## 2017-05-31 NOTE — PROGRESS NOTE ADULT - SUBJECTIVE AND OBJECTIVE BOX
Pt S&E. Pain controlled. No acute events overnight  AVSS  Gen: NAD  Right Lower Extremity:  Dsg c/d/i  SILT L2-S1  +EHL/FHL/TA/Gastroc  DP+  Soft compartments, - calf ttp
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
## 2017-06-05 DIAGNOSIS — S82.141A DISPLACED BICONDYLAR FRACTURE OF RIGHT TIBIA, INITIAL ENCOUNTER FOR CLOSED FRACTURE: ICD-10-CM
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
## 2017-06-05 DIAGNOSIS — F32.9 MAJOR DEPRESSIVE DISORDER, SINGLE EPISODE, UNSPECIFIED: ICD-10-CM

## 2017-06-05 DIAGNOSIS — F41.9 ANXIETY DISORDER, UNSPECIFIED: ICD-10-CM
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
## 2019-10-29 ENCOUNTER — APPOINTMENT (OUTPATIENT)
Dept: ORTHOPEDIC SURGERY | Facility: CLINIC | Age: 74
End: 2019-10-29
Payer: MEDICARE

## 2019-10-29 VITALS
BODY MASS INDEX: 22.88 KG/M2 | HEART RATE: 58 BPM | DIASTOLIC BLOOD PRESSURE: 72 MMHG | WEIGHT: 134 LBS | SYSTOLIC BLOOD PRESSURE: 129 MMHG | HEIGHT: 64 IN

## 2019-10-29 DIAGNOSIS — Z87.81 OTHER SPECIFIED POSTPROCEDURAL STATES: ICD-10-CM

## 2019-10-29 DIAGNOSIS — Z87.39 PERSONAL HISTORY OF OTHER DISEASES OF THE MUSCULOSKELETAL SYSTEM AND CONNECTIVE TISSUE: ICD-10-CM

## 2019-10-29 DIAGNOSIS — Z98.890 OTHER SPECIFIED POSTPROCEDURAL STATES: ICD-10-CM

## 2019-10-29 DIAGNOSIS — M12.561 TRAUMATIC ARTHROPATHY, RIGHT KNEE: ICD-10-CM

## 2019-10-29 DIAGNOSIS — M25.562 PAIN IN RIGHT KNEE: ICD-10-CM

## 2019-10-29 DIAGNOSIS — Z85.820 PERSONAL HISTORY OF MALIGNANT MELANOMA OF SKIN: ICD-10-CM

## 2019-10-29 DIAGNOSIS — M25.561 PAIN IN RIGHT KNEE: ICD-10-CM

## 2019-10-29 PROCEDURE — 73562 X-RAY EXAM OF KNEE 3: CPT | Mod: 50

## 2019-10-29 PROCEDURE — 72170 X-RAY EXAM OF PELVIS: CPT | Mod: 59

## 2019-10-29 PROCEDURE — 99203 OFFICE O/P NEW LOW 30 MIN: CPT

## 2019-11-04 PROBLEM — M12.561 TRAUMATIC ARTHRITIS OF KNEE, RIGHT: Status: ACTIVE | Noted: 2019-10-29

## 2019-11-16 PROBLEM — Z98.890 S/P ORIF (OPEN REDUCTION INTERNAL FIXATION) FRACTURE: Status: ACTIVE | Noted: 2019-11-16

## 2020-01-07 ENCOUNTER — OUTPATIENT (OUTPATIENT)
Dept: OUTPATIENT SERVICES | Facility: HOSPITAL | Age: 75
LOS: 1 days | End: 2020-01-07
Payer: MEDICARE

## 2020-01-07 VITALS
SYSTOLIC BLOOD PRESSURE: 125 MMHG | RESPIRATION RATE: 18 BRPM | HEIGHT: 64 IN | HEART RATE: 63 BPM | OXYGEN SATURATION: 98 % | DIASTOLIC BLOOD PRESSURE: 81 MMHG | WEIGHT: 136.03 LBS | TEMPERATURE: 98 F

## 2020-01-07 DIAGNOSIS — Z98.49 CATARACT EXTRACTION STATUS, UNSPECIFIED EYE: Chronic | ICD-10-CM

## 2020-01-07 DIAGNOSIS — Z98.890 OTHER SPECIFIED POSTPROCEDURAL STATES: Chronic | ICD-10-CM

## 2020-01-07 DIAGNOSIS — Z90.49 ACQUIRED ABSENCE OF OTHER SPECIFIED PARTS OF DIGESTIVE TRACT: Chronic | ICD-10-CM

## 2020-01-07 DIAGNOSIS — Z01.818 ENCOUNTER FOR OTHER PREPROCEDURAL EXAMINATION: ICD-10-CM

## 2020-01-07 DIAGNOSIS — Z98.51 TUBAL LIGATION STATUS: Chronic | ICD-10-CM

## 2020-01-07 DIAGNOSIS — M12.561 TRAUMATIC ARTHROPATHY, RIGHT KNEE: ICD-10-CM

## 2020-01-07 DIAGNOSIS — Z96.643 PRESENCE OF ARTIFICIAL HIP JOINT, BILATERAL: Chronic | ICD-10-CM

## 2020-01-07 DIAGNOSIS — Z98.891 HISTORY OF UTERINE SCAR FROM PREVIOUS SURGERY: Chronic | ICD-10-CM

## 2020-01-07 DIAGNOSIS — Z87.81 PERSONAL HISTORY OF (HEALED) TRAUMATIC FRACTURE: Chronic | ICD-10-CM

## 2020-01-07 LAB
ALBUMIN SERPL ELPH-MCNC: 3.9 G/DL — SIGNIFICANT CHANGE UP (ref 3.3–5)
ALP SERPL-CCNC: 66 U/L — SIGNIFICANT CHANGE UP (ref 30–120)
ALT FLD-CCNC: 30 U/L DA — SIGNIFICANT CHANGE UP (ref 10–60)
ANION GAP SERPL CALC-SCNC: 6 MMOL/L — SIGNIFICANT CHANGE UP (ref 5–17)
APTT BLD: 32.8 SEC — SIGNIFICANT CHANGE UP (ref 28.5–37)
AST SERPL-CCNC: 19 U/L — SIGNIFICANT CHANGE UP (ref 10–40)
BILIRUB SERPL-MCNC: 0.5 MG/DL — SIGNIFICANT CHANGE UP (ref 0.2–1.2)
BLD GP AB SCN SERPL QL: SIGNIFICANT CHANGE UP
BUN SERPL-MCNC: 20 MG/DL — SIGNIFICANT CHANGE UP (ref 7–23)
CALCIUM SERPL-MCNC: 9.5 MG/DL — SIGNIFICANT CHANGE UP (ref 8.4–10.5)
CHLORIDE SERPL-SCNC: 104 MMOL/L — SIGNIFICANT CHANGE UP (ref 96–108)
CO2 SERPL-SCNC: 28 MMOL/L — SIGNIFICANT CHANGE UP (ref 22–31)
CREAT SERPL-MCNC: 1.03 MG/DL — SIGNIFICANT CHANGE UP (ref 0.5–1.3)
GLUCOSE SERPL-MCNC: 98 MG/DL — SIGNIFICANT CHANGE UP (ref 70–99)
HCT VFR BLD CALC: 43.7 % — SIGNIFICANT CHANGE UP (ref 34.5–45)
HGB BLD-MCNC: 14.5 G/DL — SIGNIFICANT CHANGE UP (ref 11.5–15.5)
INR BLD: 1.02 RATIO — SIGNIFICANT CHANGE UP (ref 0.88–1.16)
MCHC RBC-ENTMCNC: 31.9 PG — SIGNIFICANT CHANGE UP (ref 27–34)
MCHC RBC-ENTMCNC: 33.2 GM/DL — SIGNIFICANT CHANGE UP (ref 32–36)
MCV RBC AUTO: 96.3 FL — SIGNIFICANT CHANGE UP (ref 80–100)
NRBC # BLD: 0 /100 WBCS — SIGNIFICANT CHANGE UP (ref 0–0)
PLATELET # BLD AUTO: 256 K/UL — SIGNIFICANT CHANGE UP (ref 150–400)
POTASSIUM SERPL-MCNC: 4.3 MMOL/L — SIGNIFICANT CHANGE UP (ref 3.5–5.3)
POTASSIUM SERPL-SCNC: 4.3 MMOL/L — SIGNIFICANT CHANGE UP (ref 3.5–5.3)
PROT SERPL-MCNC: 7.5 G/DL — SIGNIFICANT CHANGE UP (ref 6–8.3)
PROTHROM AB SERPL-ACNC: 11.1 SEC — SIGNIFICANT CHANGE UP (ref 10–12.9)
RBC # BLD: 4.54 M/UL — SIGNIFICANT CHANGE UP (ref 3.8–5.2)
RBC # FLD: 12.5 % — SIGNIFICANT CHANGE UP (ref 10.3–14.5)
SODIUM SERPL-SCNC: 138 MMOL/L — SIGNIFICANT CHANGE UP (ref 135–145)
WBC # BLD: 5.35 K/UL — SIGNIFICANT CHANGE UP (ref 3.8–10.5)
WBC # FLD AUTO: 5.35 K/UL — SIGNIFICANT CHANGE UP (ref 3.8–10.5)

## 2020-01-07 PROCEDURE — 93010 ELECTROCARDIOGRAM REPORT: CPT

## 2020-01-07 PROCEDURE — G0463: CPT

## 2020-01-07 PROCEDURE — 93005 ELECTROCARDIOGRAM TRACING: CPT

## 2020-01-07 NOTE — H&P PST ADULT - RS GEN PE MLT RESP DETAILS PC
good air movement/breath sounds equal/clear to auscultation bilaterally/normal/respirations non-labored/airway patent

## 2020-01-07 NOTE — H&P PST ADULT - NSICDXPASTMEDICALHX_GEN_ALL_CORE_FT
PAST MEDICAL HISTORY:  Anxiety     Depression     History of melanoma excision face    No pertinent past medical history

## 2020-01-07 NOTE — H&P PST ADULT - ASSESSMENT
73 y/o female with right knee pain  Planned surgery.-removal of hardware right knee and right total knee replacement on 1/22/20  Will obtain medical clearance  Pre op instructions provided  Instructions provided on medications to continue and to take the day morning of surgery

## 2020-01-07 NOTE — H&P PST ADULT - NSICDXFAMILYHX_GEN_ALL_CORE_FT
FAMILY HISTORY:  FH: skin cancer, mother  FHx: lung cancer, father FAMILY HISTORY:  Family history of glioblastoma, son age 29  FH: skin cancer, mother  FHx: lung cancer, father

## 2020-01-07 NOTE — H&P PST ADULT - HISTORY OF PRESENT ILLNESS
75 y/o female with right knee pain for many years. History of ORIF in past. Intermittent pain with weight bearing. Failed conservative therapy. No pain meds at present. nToday for pST in NAD 75 y/o female with right knee pain for many years. History of ORIF in past. Intermittent pain with weight bearing. Failed conservative therapy. No pain meds at present. Today for pST in NAD

## 2020-01-07 NOTE — H&P PST ADULT - NSICDXPASTSURGICALHX_GEN_ALL_CORE_FT
PAST SURGICAL HISTORY:  History of bilateral hip replacements     History of humerus fracture age 17, s/p orif, hardware removed    S/P ACL surgery left knee    S/P appendectomy     S/P  x2    S/P cataract extraction right    S/P ORIF (open reduction internal fixation) fracture right tibia/plateau [id number] 2017    S/P tubal ligation PAST SURGICAL HISTORY:  History of bilateral hip replacements     History of humerus fracture age 17, s/p orif, hardware removed    S/P ACL surgery left knee    S/P appendectomy     S/P  x2    S/P cataract extraction right    S/P ORIF (open reduction internal fixation) fracture right tibia/plateau [id number] 2017    S/P tubal ligation

## 2020-01-08 LAB
MRSA PCR RESULT.: SIGNIFICANT CHANGE UP
S AUREUS DNA NOSE QL NAA+PROBE: SIGNIFICANT CHANGE UP

## 2020-01-11 ENCOUNTER — OTHER (OUTPATIENT)
Age: 75
End: 2020-01-11

## 2020-01-21 ENCOUNTER — TRANSCRIPTION ENCOUNTER (OUTPATIENT)
Age: 75
End: 2020-01-21

## 2020-01-21 PROBLEM — F32.9 MAJOR DEPRESSIVE DISORDER, SINGLE EPISODE, UNSPECIFIED: Chronic | Status: ACTIVE | Noted: 2020-01-07

## 2020-01-21 PROBLEM — F41.9 ANXIETY DISORDER, UNSPECIFIED: Chronic | Status: ACTIVE | Noted: 2020-01-07

## 2020-01-22 ENCOUNTER — APPOINTMENT (OUTPATIENT)
Dept: ORTHOPEDIC SURGERY | Facility: HOSPITAL | Age: 75
End: 2020-01-22

## 2020-01-22 ENCOUNTER — RESULT REVIEW (OUTPATIENT)
Age: 75
End: 2020-01-22

## 2020-01-22 ENCOUNTER — INPATIENT (INPATIENT)
Facility: HOSPITAL | Age: 75
LOS: 2 days | Discharge: HOME | DRG: 470 | End: 2020-01-25
Attending: ORTHOPAEDIC SURGERY | Admitting: ORTHOPAEDIC SURGERY
Payer: MEDICARE

## 2020-01-22 VITALS
HEIGHT: 64 IN | DIASTOLIC BLOOD PRESSURE: 74 MMHG | HEART RATE: 60 BPM | TEMPERATURE: 98 F | SYSTOLIC BLOOD PRESSURE: 118 MMHG | RESPIRATION RATE: 16 BRPM | WEIGHT: 130.95 LBS | OXYGEN SATURATION: 100 %

## 2020-01-22 DIAGNOSIS — Z98.890 OTHER SPECIFIED POSTPROCEDURAL STATES: Chronic | ICD-10-CM

## 2020-01-22 DIAGNOSIS — Z98.51 TUBAL LIGATION STATUS: Chronic | ICD-10-CM

## 2020-01-22 DIAGNOSIS — Z96.643 PRESENCE OF ARTIFICIAL HIP JOINT, BILATERAL: Chronic | ICD-10-CM

## 2020-01-22 DIAGNOSIS — Z87.81 PERSONAL HISTORY OF (HEALED) TRAUMATIC FRACTURE: Chronic | ICD-10-CM

## 2020-01-22 DIAGNOSIS — Z98.891 HISTORY OF UTERINE SCAR FROM PREVIOUS SURGERY: Chronic | ICD-10-CM

## 2020-01-22 DIAGNOSIS — M12.561 TRAUMATIC ARTHROPATHY, RIGHT KNEE: ICD-10-CM

## 2020-01-22 DIAGNOSIS — Z90.49 ACQUIRED ABSENCE OF OTHER SPECIFIED PARTS OF DIGESTIVE TRACT: Chronic | ICD-10-CM

## 2020-01-22 DIAGNOSIS — Z98.49 CATARACT EXTRACTION STATUS, UNSPECIFIED EYE: Chronic | ICD-10-CM

## 2020-01-22 LAB
ANION GAP SERPL CALC-SCNC: 7 MMOL/L — SIGNIFICANT CHANGE UP (ref 5–17)
BUN SERPL-MCNC: 14 MG/DL — SIGNIFICANT CHANGE UP (ref 7–23)
CALCIUM SERPL-MCNC: 8.4 MG/DL — SIGNIFICANT CHANGE UP (ref 8.4–10.5)
CHLORIDE SERPL-SCNC: 110 MMOL/L — HIGH (ref 96–108)
CO2 SERPL-SCNC: 24 MMOL/L — SIGNIFICANT CHANGE UP (ref 22–31)
CREAT SERPL-MCNC: 0.84 MG/DL — SIGNIFICANT CHANGE UP (ref 0.5–1.3)
GLUCOSE SERPL-MCNC: 158 MG/DL — HIGH (ref 70–99)
HCT VFR BLD CALC: 34.3 % — LOW (ref 34.5–45)
HGB BLD-MCNC: 11.4 G/DL — LOW (ref 11.5–15.5)
MCHC RBC-ENTMCNC: 32.1 PG — SIGNIFICANT CHANGE UP (ref 27–34)
MCHC RBC-ENTMCNC: 33.2 GM/DL — SIGNIFICANT CHANGE UP (ref 32–36)
MCV RBC AUTO: 96.6 FL — SIGNIFICANT CHANGE UP (ref 80–100)
NRBC # BLD: 0 /100 WBCS — SIGNIFICANT CHANGE UP (ref 0–0)
PLATELET # BLD AUTO: 202 K/UL — SIGNIFICANT CHANGE UP (ref 150–400)
POTASSIUM SERPL-MCNC: 4 MMOL/L — SIGNIFICANT CHANGE UP (ref 3.5–5.3)
POTASSIUM SERPL-SCNC: 4 MMOL/L — SIGNIFICANT CHANGE UP (ref 3.5–5.3)
RBC # BLD: 3.55 M/UL — LOW (ref 3.8–5.2)
RBC # FLD: 12.4 % — SIGNIFICANT CHANGE UP (ref 10.3–14.5)
SODIUM SERPL-SCNC: 141 MMOL/L — SIGNIFICANT CHANGE UP (ref 135–145)
WBC # BLD: 8.69 K/UL — SIGNIFICANT CHANGE UP (ref 3.8–10.5)
WBC # FLD AUTO: 8.69 K/UL — SIGNIFICANT CHANGE UP (ref 3.8–10.5)

## 2020-01-22 PROCEDURE — 99222 1ST HOSP IP/OBS MODERATE 55: CPT

## 2020-01-22 PROCEDURE — 88305 TISSUE EXAM BY PATHOLOGIST: CPT | Mod: 26

## 2020-01-22 PROCEDURE — 73562 X-RAY EXAM OF KNEE 3: CPT | Mod: 26,RT

## 2020-01-22 PROCEDURE — 88311 DECALCIFY TISSUE: CPT | Mod: 26

## 2020-01-22 PROCEDURE — 27447 TOTAL KNEE ARTHROPLASTY: CPT | Mod: 22,RT

## 2020-01-22 PROCEDURE — 88300 SURGICAL PATH GROSS: CPT | Mod: 26

## 2020-01-22 RX ORDER — BUPROPION HYDROCHLORIDE 150 MG/1
100 TABLET, EXTENDED RELEASE ORAL DAILY
Refills: 0 | Status: DISCONTINUED | OUTPATIENT
Start: 2020-01-22 | End: 2020-01-24

## 2020-01-22 RX ORDER — CEFAZOLIN SODIUM 1 G
2000 VIAL (EA) INJECTION ONCE
Refills: 0 | Status: COMPLETED | OUTPATIENT
Start: 2020-01-22 | End: 2020-01-22

## 2020-01-22 RX ORDER — SODIUM CHLORIDE 9 MG/ML
1000 INJECTION, SOLUTION INTRAVENOUS
Refills: 0 | Status: DISCONTINUED | OUTPATIENT
Start: 2020-01-22 | End: 2020-01-24

## 2020-01-22 RX ORDER — TRANEXAMIC ACID 100 MG/ML
1000 INJECTION, SOLUTION INTRAVENOUS ONCE
Refills: 0 | Status: COMPLETED | OUTPATIENT
Start: 2020-01-22 | End: 2020-01-22

## 2020-01-22 RX ORDER — APIXABAN 2.5 MG/1
2.5 TABLET, FILM COATED ORAL
Refills: 0 | Status: COMPLETED | OUTPATIENT
Start: 2020-01-23 | End: 2020-01-23

## 2020-01-22 RX ORDER — BUPROPION HYDROCHLORIDE 150 MG/1
1 TABLET, EXTENDED RELEASE ORAL
Qty: 0 | Refills: 0 | DISCHARGE

## 2020-01-22 RX ORDER — SENNA PLUS 8.6 MG/1
2 TABLET ORAL AT BEDTIME
Refills: 0 | Status: DISCONTINUED | OUTPATIENT
Start: 2020-01-22 | End: 2020-01-25

## 2020-01-22 RX ORDER — MAGNESIUM HYDROXIDE 400 MG/1
30 TABLET, CHEWABLE ORAL DAILY
Refills: 0 | Status: DISCONTINUED | OUTPATIENT
Start: 2020-01-22 | End: 2020-01-25

## 2020-01-22 RX ORDER — ONDANSETRON 8 MG/1
4 TABLET, FILM COATED ORAL EVERY 6 HOURS
Refills: 0 | Status: DISCONTINUED | OUTPATIENT
Start: 2020-01-22 | End: 2020-01-25

## 2020-01-22 RX ORDER — BUPROPION HYDROCHLORIDE 150 MG/1
100 TABLET, EXTENDED RELEASE ORAL DAILY
Refills: 0 | Status: DISCONTINUED | OUTPATIENT
Start: 2020-01-22 | End: 2020-01-22

## 2020-01-22 RX ORDER — SODIUM CHLORIDE 9 MG/ML
1000 INJECTION, SOLUTION INTRAVENOUS
Refills: 0 | Status: DISCONTINUED | OUTPATIENT
Start: 2020-01-22 | End: 2020-01-22

## 2020-01-22 RX ORDER — APIXABAN 2.5 MG/1
2.5 TABLET, FILM COATED ORAL EVERY 12 HOURS
Refills: 0 | Status: DISCONTINUED | OUTPATIENT
Start: 2020-01-24 | End: 2020-01-25

## 2020-01-22 RX ORDER — OXYCODONE HYDROCHLORIDE 5 MG/1
5 TABLET ORAL
Refills: 0 | Status: DISCONTINUED | OUTPATIENT
Start: 2020-01-22 | End: 2020-01-23

## 2020-01-22 RX ORDER — OXYCODONE AND ACETAMINOPHEN 5; 325 MG/1; MG/1
1 TABLET ORAL ONCE
Refills: 0 | Status: DISCONTINUED | OUTPATIENT
Start: 2020-01-22 | End: 2020-01-22

## 2020-01-22 RX ORDER — OXYCODONE HYDROCHLORIDE 5 MG/1
10 TABLET ORAL
Refills: 0 | Status: DISCONTINUED | OUTPATIENT
Start: 2020-01-22 | End: 2020-01-23

## 2020-01-22 RX ORDER — ACETAMINOPHEN 500 MG
1000 TABLET ORAL ONCE
Refills: 0 | Status: COMPLETED | OUTPATIENT
Start: 2020-01-22 | End: 2020-01-22

## 2020-01-22 RX ORDER — ONDANSETRON 8 MG/1
4 TABLET, FILM COATED ORAL ONCE
Refills: 0 | Status: DISCONTINUED | OUTPATIENT
Start: 2020-01-22 | End: 2020-01-22

## 2020-01-22 RX ORDER — CELECOXIB 200 MG/1
200 CAPSULE ORAL EVERY 12 HOURS
Refills: 0 | Status: DISCONTINUED | OUTPATIENT
Start: 2020-01-22 | End: 2020-01-25

## 2020-01-22 RX ORDER — HYDROMORPHONE HYDROCHLORIDE 2 MG/ML
0.5 INJECTION INTRAMUSCULAR; INTRAVENOUS; SUBCUTANEOUS
Refills: 0 | Status: DISCONTINUED | OUTPATIENT
Start: 2020-01-22 | End: 2020-01-25

## 2020-01-22 RX ORDER — ACETAMINOPHEN 500 MG
1000 TABLET ORAL EVERY 8 HOURS
Refills: 0 | Status: DISCONTINUED | OUTPATIENT
Start: 2020-01-23 | End: 2020-01-25

## 2020-01-22 RX ORDER — APREPITANT 80 MG/1
40 CAPSULE ORAL ONCE
Refills: 0 | Status: COMPLETED | OUTPATIENT
Start: 2020-01-22 | End: 2020-01-22

## 2020-01-22 RX ORDER — ACETAMINOPHEN 500 MG
1000 TABLET ORAL EVERY 6 HOURS
Refills: 0 | Status: COMPLETED | OUTPATIENT
Start: 2020-01-22 | End: 2020-01-23

## 2020-01-22 RX ORDER — HYDROMORPHONE HYDROCHLORIDE 2 MG/ML
0.5 INJECTION INTRAMUSCULAR; INTRAVENOUS; SUBCUTANEOUS
Refills: 0 | Status: DISCONTINUED | OUTPATIENT
Start: 2020-01-22 | End: 2020-01-22

## 2020-01-22 RX ORDER — POLYETHYLENE GLYCOL 3350 17 G/17G
17 POWDER, FOR SOLUTION ORAL DAILY
Refills: 0 | Status: DISCONTINUED | OUTPATIENT
Start: 2020-01-22 | End: 2020-01-25

## 2020-01-22 RX ORDER — CEFAZOLIN SODIUM 1 G
2000 VIAL (EA) INJECTION EVERY 8 HOURS
Refills: 0 | Status: COMPLETED | OUTPATIENT
Start: 2020-01-22 | End: 2020-01-23

## 2020-01-22 RX ORDER — ONDANSETRON 8 MG/1
4 TABLET, FILM COATED ORAL ONCE
Refills: 0 | Status: DISCONTINUED | OUTPATIENT
Start: 2020-01-22 | End: 2020-01-25

## 2020-01-22 RX ORDER — CHLORHEXIDINE GLUCONATE 213 G/1000ML
1 SOLUTION TOPICAL ONCE
Refills: 0 | Status: COMPLETED | OUTPATIENT
Start: 2020-01-22 | End: 2020-01-22

## 2020-01-22 RX ORDER — PANTOPRAZOLE SODIUM 20 MG/1
40 TABLET, DELAYED RELEASE ORAL
Refills: 0 | Status: DISCONTINUED | OUTPATIENT
Start: 2020-01-22 | End: 2020-01-25

## 2020-01-22 RX ADMIN — OXYCODONE HYDROCHLORIDE 5 MILLIGRAM(S): 5 TABLET ORAL at 21:30

## 2020-01-22 RX ADMIN — HYDROMORPHONE HYDROCHLORIDE 0.5 MILLIGRAM(S): 2 INJECTION INTRAMUSCULAR; INTRAVENOUS; SUBCUTANEOUS at 17:57

## 2020-01-22 RX ADMIN — Medication 100 MILLIGRAM(S): at 22:04

## 2020-01-22 RX ADMIN — Medication 1000 MILLIGRAM(S): at 20:45

## 2020-01-22 RX ADMIN — CELECOXIB 200 MILLIGRAM(S): 200 CAPSULE ORAL at 20:46

## 2020-01-22 RX ADMIN — SODIUM CHLORIDE 100 MILLILITER(S): 9 INJECTION, SOLUTION INTRAVENOUS at 17:54

## 2020-01-22 RX ADMIN — Medication 400 MILLIGRAM(S): at 20:44

## 2020-01-22 RX ADMIN — CHLORHEXIDINE GLUCONATE 1 APPLICATION(S): 213 SOLUTION TOPICAL at 10:56

## 2020-01-22 RX ADMIN — CELECOXIB 200 MILLIGRAM(S): 200 CAPSULE ORAL at 20:45

## 2020-01-22 RX ADMIN — APREPITANT 40 MILLIGRAM(S): 80 CAPSULE ORAL at 10:55

## 2020-01-22 RX ADMIN — HYDROMORPHONE HYDROCHLORIDE 0.5 MILLIGRAM(S): 2 INJECTION INTRAMUSCULAR; INTRAVENOUS; SUBCUTANEOUS at 18:15

## 2020-01-22 RX ADMIN — OXYCODONE HYDROCHLORIDE 5 MILLIGRAM(S): 5 TABLET ORAL at 20:51

## 2020-01-22 NOTE — PRE-OP CHECKLIST - TYPE OF SOLUTION
Prior to injection verified patient identity using patient's name and date of birth.   Patient instructed to remain in clinic for 20 minutes afterwards, and to report any adverse reaction to me immediately.  Sophie Burns MA      
RL

## 2020-01-22 NOTE — PRE-OP CHECKLIST - INTERNAL PROSTHESES
yes(specify)/right leg hardware, left knee screws right leg hardware, left knee screws, bilateral hips/yes(specify)

## 2020-01-22 NOTE — CONSULT NOTE ADULT - ASSESSMENT
74F with Depression/Anxiety admitted for aftercare following Elective replacement of Right Knee.     S/P  Right TKR POD 0  Continue Bowel and pain control regimen.   Incentive Spirometer for lung expansion.  Work with PT to increase ambulation as per orthopedics.  Monitor Hgb and follow up electrolytes.   Orhtopedics on board and following     Depression/Anxiety  Wellbutrin    Diet  Regular    DVT Prophylaxis  Eliquis BID    Disposition  Full Code/Inpatient  Discharge planning pending hospital course

## 2020-01-22 NOTE — CONSULT NOTE ADULT - SUBJECTIVE AND OBJECTIVE BOX
HPI: 74F with Depression and Anxiety has been combatting pain in right knee for several years which has progressively worsened.  Patient has tried multiple options for pain relief including OTC medication and as well as PT with minimal relief and has undergone elective replacement of right knee successfully.  She is currently resting in bed comfortable with good pain control.     REVIEW OF SYSTEMS:  CONSTITUTIONAL: No fever, weight loss, or fatigue  EYES: No eye pain, visual disturbances, or discharge  ENMT:  No difficulty hearing, tinnitus, vertigo; No sinus or throat pain  NECK: No pain or stiffness  RESPIRATORY: No cough, wheezing, chills or hemoptysis; No shortness of breath  CARDIOVASCULAR: No chest pain, palpitations, dizziness, or leg swelling  GASTROINTESTINAL: No abdominal or epigastric pain. No nausea, vomiting, or hematemesis; No diarrhea or constipation. No melena or hematochezia.  GENITOURINARY: No dysuria, frequency, hematuria, or incontinence  NEUROLOGICAL: No headaches, memory loss, loss of strength, numbness, or tremors  MUSCULOSKELETAL: No muscle or back pain      PAST MEDICAL & SURGICAL HISTORY:  History of melanoma excision: face  Depression  Anxiety  No pertinent past medical history  S/P cataract extraction: right  History of humerus fracture: age 17, s/p orif, hardware removed  S/P ACL surgery: left knee  S/P tubal ligation  S/P appendectomy  S/P : x2  S/P ORIF (open reduction internal fixation) fracture: right tibia/plateau [id number] 2017  History of bilateral hip replacements      SOCIAL HISTORY:  Tobacco; EtOH; Illicit Drugs    Allergies    adhesives (Rash)  No Known Drug Allergies    Intolerances        MEDICATIONS  (STANDING):  acetaminophen   Tablet .. 1000 milliGRAM(s) Oral every 8 hours  apixaban 2.5 milliGRAM(s) Oral <User Schedule>  buPROPion XL (24-Hour) Oral Tab/Cap - Peds 100 milliGRAM(s) Oral daily  celecoxib 200 milliGRAM(s) Oral every 12 hours  lactated ringers. 1000 milliLiter(s) (75 mL/Hr) IV Continuous <Continuous>  lactated ringers. 1000 milliLiter(s) (100 mL/Hr) IV Continuous <Continuous>  pantoprazole    Tablet 40 milliGRAM(s) Oral before breakfast  polyethylene glycol 3350 17 Gram(s) Oral daily    MEDICATIONS  (PRN):  aluminum hydroxide/magnesium hydroxide/simethicone Suspension 30 milliLiter(s) Oral four times a day PRN Indigestion  benzocaine 15 mG/menthol 3.6 mG (Sugar-Free) Lozenge 1 Lozenge Oral every 4 hours PRN Sore Throat  HYDROmorphone  Injectable 0.5 milliGRAM(s) IV Push every 10 minutes PRN Moderate Pain (4 - 6)  HYDROmorphone  Injectable 0.5 milliGRAM(s) IV Push every 3 hours PRN Severe Pain (7 - 10)  magnesium hydroxide Suspension 30 milliLiter(s) Oral daily PRN Constipation  ondansetron Injectable 4 milliGRAM(s) IV Push once PRN Nausea and/or Vomiting  ondansetron Injectable 4 milliGRAM(s) IV Push every 6 hours PRN Nausea and/or Vomiting  oxyCODONE    IR 5 milliGRAM(s) Oral every 3 hours PRN Mild Pain (1 - 3)  oxyCODONE    IR 10 milliGRAM(s) Oral every 3 hours PRN Moderate Pain (4 - 6)  senna 2 Tablet(s) Oral at bedtime PRN Constipation      FAMILY HISTORY:  Family history of glioblastoma: son age 29  FH: skin cancer: mother  FHx: lung cancer: father      Vital Signs Last 24 Hrs  T(C): 36.4 (2020 11:03), Max: 37.1 (2020 16:46)  T(F): 97.6 (2020 11:03), Max: 98.7 (2020 16:46)  HR: 67 (2020 11:03) (56 - 95)  BP: 98/51 (2020 11:03) (82/51 - 133/68)  BP(mean): 73 (2020 10:01) (73 - 73)  RR: 15 (2020 11:03) (13 - 22)  SpO2: 98% (2020 11:03) (93% - 100%)    PHYSICAL EXAM:    GENERAL: NAD, well-developed  HEAD:  Atraumatic, Normocephalic  EYES: EOMI, PERRLA, conjunctiva and sclera clear  ENMT: No tonsillar erythema, exudates, or enlargement; Moist mucous membranes, Good dentition, No lesions  NECK: Supple, No JVD, Normal thyroid  NERVOUS SYSTEM:  Alert & Oriented X3, Good concentration;  CHEST/LUNG: Clear to auscultation bilaterally; No rales, rhonchi, wheezing, or rubs  HEART: Regular rate and rhythm; No murmurs, rubs, or gallops  ABDOMEN: Soft, Nontender, Nondistended; Bowel sounds present  EXTREMITIES:  2+ Peripheral Pulses, No clubbing, cyanosis, or edema      LABS:                        9.4    13.42 )-----------( 200      ( 2020 07:08 )             28.1         137  |  104  |  18  ----------------------------<  134<H>  4.2   |  26  |  1.05    Ca    8.1<L>      2020 07:08          CAPILLARY BLOOD GLUCOSE          RADIOLOGY & ADDITIONAL STUDIES:    EKG:   Personally Reviewed:  [ ] YES     Imaging:   Personally Reviewed:  [ ] YES     Consultant(s) Notes Reviewed:      Care Discussed with Consultants/Other Providers:

## 2020-01-23 ENCOUNTER — TRANSCRIPTION ENCOUNTER (OUTPATIENT)
Age: 75
End: 2020-01-23

## 2020-01-23 LAB
ANION GAP SERPL CALC-SCNC: 7 MMOL/L — SIGNIFICANT CHANGE UP (ref 5–17)
BUN SERPL-MCNC: 18 MG/DL — SIGNIFICANT CHANGE UP (ref 7–23)
CALCIUM SERPL-MCNC: 8.1 MG/DL — LOW (ref 8.4–10.5)
CHLORIDE SERPL-SCNC: 104 MMOL/L — SIGNIFICANT CHANGE UP (ref 96–108)
CO2 SERPL-SCNC: 26 MMOL/L — SIGNIFICANT CHANGE UP (ref 22–31)
CREAT SERPL-MCNC: 1.05 MG/DL — SIGNIFICANT CHANGE UP (ref 0.5–1.3)
GLUCOSE SERPL-MCNC: 134 MG/DL — HIGH (ref 70–99)
HCT VFR BLD CALC: 28.1 % — LOW (ref 34.5–45)
HGB BLD-MCNC: 9.4 G/DL — LOW (ref 11.5–15.5)
MCHC RBC-ENTMCNC: 32.4 PG — SIGNIFICANT CHANGE UP (ref 27–34)
MCHC RBC-ENTMCNC: 33.5 GM/DL — SIGNIFICANT CHANGE UP (ref 32–36)
MCV RBC AUTO: 96.9 FL — SIGNIFICANT CHANGE UP (ref 80–100)
NRBC # BLD: 0 /100 WBCS — SIGNIFICANT CHANGE UP (ref 0–0)
PLATELET # BLD AUTO: 200 K/UL — SIGNIFICANT CHANGE UP (ref 150–400)
POTASSIUM SERPL-MCNC: 4.2 MMOL/L — SIGNIFICANT CHANGE UP (ref 3.5–5.3)
POTASSIUM SERPL-SCNC: 4.2 MMOL/L — SIGNIFICANT CHANGE UP (ref 3.5–5.3)
RBC # BLD: 2.9 M/UL — LOW (ref 3.8–5.2)
RBC # FLD: 12.4 % — SIGNIFICANT CHANGE UP (ref 10.3–14.5)
SODIUM SERPL-SCNC: 137 MMOL/L — SIGNIFICANT CHANGE UP (ref 135–145)
WBC # BLD: 13.42 K/UL — HIGH (ref 3.8–10.5)
WBC # FLD AUTO: 13.42 K/UL — HIGH (ref 3.8–10.5)

## 2020-01-23 PROCEDURE — 99232 SBSQ HOSP IP/OBS MODERATE 35: CPT

## 2020-01-23 RX ORDER — CELECOXIB 200 MG/1
1 CAPSULE ORAL
Qty: 60 | Refills: 0
Start: 2020-01-23 | End: 2020-02-21

## 2020-01-23 RX ORDER — APIXABAN 2.5 MG/1
1 TABLET, FILM COATED ORAL
Qty: 22 | Refills: 0
Start: 2020-01-23 | End: 2020-02-02

## 2020-01-23 RX ORDER — OXYCODONE HYDROCHLORIDE 5 MG/1
1 TABLET ORAL
Qty: 42 | Refills: 0
Start: 2020-01-23 | End: 2020-01-29

## 2020-01-23 RX ORDER — ACETAMINOPHEN 500 MG
2 TABLET ORAL
Qty: 0 | Refills: 0 | DISCHARGE
Start: 2020-01-23

## 2020-01-23 RX ORDER — FAMOTIDINE 10 MG/ML
40 INJECTION INTRAVENOUS AT BEDTIME
Refills: 0 | Status: DISCONTINUED | OUTPATIENT
Start: 2020-01-23 | End: 2020-01-25

## 2020-01-23 RX ORDER — ONDANSETRON 8 MG/1
4 TABLET, FILM COATED ORAL ONCE
Refills: 0 | Status: COMPLETED | OUTPATIENT
Start: 2020-01-23 | End: 2020-01-23

## 2020-01-23 RX ORDER — TRAMADOL HYDROCHLORIDE 50 MG/1
100 TABLET ORAL EVERY 6 HOURS
Refills: 0 | Status: DISCONTINUED | OUTPATIENT
Start: 2020-01-23 | End: 2020-01-25

## 2020-01-23 RX ORDER — ASPIRIN/CALCIUM CARB/MAGNESIUM 324 MG
1 TABLET ORAL
Qty: 56 | Refills: 0
Start: 2020-01-23 | End: 2020-02-19

## 2020-01-23 RX ORDER — BENZOCAINE AND MENTHOL 5; 1 G/100ML; G/100ML
1 LIQUID ORAL EVERY 4 HOURS
Refills: 0 | Status: DISCONTINUED | OUTPATIENT
Start: 2020-01-23 | End: 2020-01-25

## 2020-01-23 RX ORDER — TRAMADOL HYDROCHLORIDE 50 MG/1
50 TABLET ORAL EVERY 4 HOURS
Refills: 0 | Status: DISCONTINUED | OUTPATIENT
Start: 2020-01-23 | End: 2020-01-25

## 2020-01-23 RX ORDER — BUPROPION HYDROCHLORIDE 150 MG/1
1 TABLET, EXTENDED RELEASE ORAL
Qty: 0 | Refills: 0 | DISCHARGE

## 2020-01-23 RX ORDER — PANTOPRAZOLE SODIUM 20 MG/1
1 TABLET, DELAYED RELEASE ORAL
Qty: 30 | Refills: 1
Start: 2020-01-23 | End: 2020-03-22

## 2020-01-23 RX ADMIN — ONDANSETRON 4 MILLIGRAM(S): 8 TABLET, FILM COATED ORAL at 14:47

## 2020-01-23 RX ADMIN — OXYCODONE HYDROCHLORIDE 5 MILLIGRAM(S): 5 TABLET ORAL at 08:47

## 2020-01-23 RX ADMIN — CELECOXIB 200 MILLIGRAM(S): 200 CAPSULE ORAL at 21:44

## 2020-01-23 RX ADMIN — Medication 400 MILLIGRAM(S): at 08:47

## 2020-01-23 RX ADMIN — Medication 1000 MILLIGRAM(S): at 03:14

## 2020-01-23 RX ADMIN — Medication 100 MILLIGRAM(S): at 05:27

## 2020-01-23 RX ADMIN — Medication 1000 MILLIGRAM(S): at 08:47

## 2020-01-23 RX ADMIN — Medication 30 MILLILITER(S): at 21:43

## 2020-01-23 RX ADMIN — SODIUM CHLORIDE 75 MILLILITER(S): 9 INJECTION, SOLUTION INTRAVENOUS at 05:27

## 2020-01-23 RX ADMIN — PANTOPRAZOLE SODIUM 40 MILLIGRAM(S): 20 TABLET, DELAYED RELEASE ORAL at 05:28

## 2020-01-23 RX ADMIN — CELECOXIB 200 MILLIGRAM(S): 200 CAPSULE ORAL at 08:47

## 2020-01-23 RX ADMIN — ONDANSETRON 4 MILLIGRAM(S): 8 TABLET, FILM COATED ORAL at 02:47

## 2020-01-23 RX ADMIN — TRAMADOL HYDROCHLORIDE 50 MILLIGRAM(S): 50 TABLET ORAL at 22:14

## 2020-01-23 RX ADMIN — Medication 1000 MILLIGRAM(S): at 17:30

## 2020-01-23 RX ADMIN — OXYCODONE HYDROCHLORIDE 5 MILLIGRAM(S): 5 TABLET ORAL at 13:27

## 2020-01-23 RX ADMIN — TRAMADOL HYDROCHLORIDE 50 MILLIGRAM(S): 50 TABLET ORAL at 17:30

## 2020-01-23 RX ADMIN — OXYCODONE HYDROCHLORIDE 5 MILLIGRAM(S): 5 TABLET ORAL at 09:30

## 2020-01-23 RX ADMIN — OXYCODONE HYDROCHLORIDE 5 MILLIGRAM(S): 5 TABLET ORAL at 12:44

## 2020-01-23 RX ADMIN — FAMOTIDINE 40 MILLIGRAM(S): 10 INJECTION INTRAVENOUS at 21:44

## 2020-01-23 RX ADMIN — TRAMADOL HYDROCHLORIDE 50 MILLIGRAM(S): 50 TABLET ORAL at 18:10

## 2020-01-23 RX ADMIN — TRAMADOL HYDROCHLORIDE 50 MILLIGRAM(S): 50 TABLET ORAL at 21:44

## 2020-01-23 RX ADMIN — Medication 1000 MILLIGRAM(S): at 17:29

## 2020-01-23 RX ADMIN — Medication 400 MILLIGRAM(S): at 03:14

## 2020-01-23 RX ADMIN — APIXABAN 2.5 MILLIGRAM(S): 2.5 TABLET, FILM COATED ORAL at 14:16

## 2020-01-23 NOTE — DISCHARGE NOTE PROVIDER - NSDCCPTREATMENT_GEN_ALL_CORE_FT
PRINCIPAL PROCEDURE  Procedure: Right total knee replacement  Findings and Treatment: PRINCIPAL PROCEDURE  Procedure: Right total knee replacement  Findings and Treatment: arthritis right knee

## 2020-01-23 NOTE — DISCHARGE NOTE PROVIDER - NSDCCPCAREPLAN_GEN_ALL_CORE_FT
PRINCIPAL DISCHARGE DIAGNOSIS  Diagnosis: Traumatic arthritis of right knee  Assessment and Plan of Treatment: Physical Therapy/Occupational Therapy for ambulation, transfers, stairs, ADL's, Range of Motion Exercises, Isometrics.  Full weight bearing as tolerated with rolling walker  Range of Motion Goals: Flexion 120 degrees; Extension 0 degrees  Keep incision clean and dry.  Suture/prineo dressing removal 14 days after surgery at rehab facility or Surgeon's office  May shower post-op day #5 if no drainage from incision  Follow up with your primary care physician within 2-3 weeks of discharge home PRINCIPAL DISCHARGE DIAGNOSIS  Diagnosis: Traumatic arthritis of right knee  Assessment and Plan of Treatment: Physical Therapy/Occupational Therapy for ambulation, transfers, stairs, ADL's, Range of Motion Exercises, Isometrics.  Full weight bearing as tolerated with rolling walker  Range of Motion Goals: Flexion 120 degrees; Extension 0 degrees  Keep incision clean and dry.  Suture/prineo dressing removal 14 days after surgery at rehab facility or Surgeon's office  May shower post-op day #5 if no drainage from incision  Follow up with your primary care physician within 2-3 weeks of discharge home.  Remove ann-marie dressing on 1/29/20.  If battery pack stops working, may remove dressing and leave off.

## 2020-01-23 NOTE — DISCHARGE NOTE PROVIDER - NSDCMRMEDTOKEN_GEN_ALL_CORE_FT
acetaminophen 500 mg oral tablet: 2 tab(s) orally every 8 hours  apixaban 2.5 mg oral tablet: 1 tab(s) orally every 12 hours  buPROPion 100 mg oral tablet: 1 tab(s) orally once a day  celecoxib 200 mg oral capsule: 1 cap(s) orally every 12 hours. Take at least 2 hours after aspirin  Ecotrin Adult Low Strength 81 mg oral delayed release tablet: 1 tab(s) orally every 12 hours after completion of Eliquis. Take at least 2 hours before celebrex.  estradiol topical: 10mcg 2x week  oxyCODONE 5 mg oral tablet: 1 tab(s) orally every 4 hours, As Needed - Pain  MDD:6   Reference #: [id number]  pantoprazole 40 mg oral delayed release tablet: 1 tab(s) orally once a day (before a meal) acetaminophen 500 mg oral tablet: 2 tab(s) orally every 8 hours  apixaban 2.5 mg oral tablet: 1 tab(s) orally every 12 hours  buPROPion 100 mg oral tablet: 1 tab(s) orally once a day  celecoxib 200 mg oral capsule: 1 cap(s) orally every 12 hours. Take at least 2 hours after aspirin  Ecotrin Adult Low Strength 81 mg oral delayed release tablet: 1 tab(s) orally every 12 hours after completion of Eliquis. Take at least 2 hours before celebrex.  estradiol topical: 10mcg 2x week  oxyCODONE 5 mg oral tablet: 1 tab(s) orally every 4 hours, As Needed - Pain  MDD:6   Reference #: [id number]  pantoprazole 40 mg oral delayed release tablet: 1 tab(s) orally once a day (before a meal) acetaminophen 500 mg oral tablet: 2 tab(s) orally every 8 hours  apixaban 2.5 mg oral tablet: 1 tab(s) orally every 12 hours  buPROPion 150 mg/24 hours (XL) oral tablet, extended release: 1 tab(s) orally every 24 hours  celecoxib 200 mg oral capsule: 1 cap(s) orally every 12 hours. Take at least 2 hours after aspirin  Ecotrin Adult Low Strength 81 mg oral delayed release tablet: 1 tab(s) orally every 12 hours after completion of Eliquis. Take at least 2 hours before celebrex.  estradiol topical: 10mcg 2x week.  May restart in 4-6 weeks  famotidine 40 mg oral tablet: 1 tab(s) orally once a day (at bedtime)  ferrous sulfate 325 mg (65 mg elemental iron) oral tablet: 1 tab(s) orally 2 times a day  pantoprazole 40 mg oral delayed release tablet: 1 tab(s) orally once a day (before a meal)  polyethylene glycol 3350 oral powder for reconstitution: 17 gram(s) orally once a day  senna oral tablet: 2 tab(s) orally once a day (at bedtime), As needed, Constipation  traMADol 50 mg oral tablet: 1 tab(s) orally every 4 hours, As needed for pain MDD:6  [facility]  [id number] acetaminophen 500 mg oral tablet: 2 tab(s) orally every 8 hours  apixaban 2.5 mg oral tablet: 1 tab(s) orally every 12 hours  buPROPion 150 mg/24 hours (XL) oral tablet, extended release: 1 tab(s) orally every 24 hours  celecoxib 200 mg oral capsule: 1 cap(s) orally every 12 hours. Take at least 2 hours after aspirin  Ecotrin Adult Low Strength 81 mg oral delayed release tablet: 1 tab(s) orally every 12 hours after completion of Eliquis. Take at least 2 hours before celebrex.  estradiol topical: 10mcg 2x week.  May restart in 4-6 weeks  famotidine 40 mg oral tablet: 1 tab(s) orally once a day (at bedtime)  ferrous sulfate 325 mg (65 mg elemental iron) oral tablet: 1 tab(s) orally 2 times a day  pantoprazole 40 mg oral delayed release tablet: 1 tab(s) orally once a day (before a meal)  polyethylene glycol 3350 oral powder for reconstitution: 17 gram(s) orally once a day  senna oral tablet: 2 tab(s) orally once a day (at bedtime), As needed, Constipation  traMADol 50 mg oral tablet: 1 tab(s) orally every 4 hours, As needed for pain MDD:6  [facility]  [phone]

## 2020-01-23 NOTE — DISCHARGE NOTE PROVIDER - NSDCACTIVITY_GEN_ALL_CORE
Showering allowed/Do not drive or operate machinery/Walking - Outdoors allowed/Walking - Indoors allowed/Stairs allowed/No heavy lifting/straining

## 2020-01-23 NOTE — DISCHARGE NOTE NURSING/CASE MANAGEMENT/SOCIAL WORK - PATIENT PORTAL LINK FT
You can access the FollowMyHealth Patient Portal offered by [facility] by registering at the following website: [email or web address]. By joining [facility]’s FollowMyHealth portal, you will also be able to view your health information using other applications (apps) compatible with our system.

## 2020-01-23 NOTE — DISCHARGE NOTE NURSING/CASE MANAGEMENT/SOCIAL WORK - NSSCNAMETXT_GEN_ALL_CORE
[facility]   Please contact the home care agency if you have not heard from them by 12 noon on the day after your hospital discharge.   Nurse to visit the day after hospital discharge; Rehabilitation Therapists to follow

## 2020-01-23 NOTE — PROGRESS NOTE ADULT - SUBJECTIVE AND OBJECTIVE BOX
Subjective: Patient doing well and working with PT. No complaints at this time.     MEDICATIONS  (STANDING):  acetaminophen   Tablet .. 1000 milliGRAM(s) Oral every 8 hours  apixaban 2.5 milliGRAM(s) Oral <User Schedule>  buPROPion XL (24-Hour) Oral Tab/Cap - Peds 100 milliGRAM(s) Oral daily  celecoxib 200 milliGRAM(s) Oral every 12 hours  lactated ringers. 1000 milliLiter(s) (75 mL/Hr) IV Continuous <Continuous>  lactated ringers. 1000 milliLiter(s) (100 mL/Hr) IV Continuous <Continuous>  pantoprazole    Tablet 40 milliGRAM(s) Oral before breakfast  polyethylene glycol 3350 17 Gram(s) Oral daily    MEDICATIONS  (PRN):  aluminum hydroxide/magnesium hydroxide/simethicone Suspension 30 milliLiter(s) Oral four times a day PRN Indigestion  benzocaine 15 mG/menthol 3.6 mG (Sugar-Free) Lozenge 1 Lozenge Oral every 4 hours PRN Sore Throat  HYDROmorphone  Injectable 0.5 milliGRAM(s) IV Push every 10 minutes PRN Moderate Pain (4 - 6)  HYDROmorphone  Injectable 0.5 milliGRAM(s) IV Push every 3 hours PRN Severe Pain (7 - 10)  magnesium hydroxide Suspension 30 milliLiter(s) Oral daily PRN Constipation  ondansetron Injectable 4 milliGRAM(s) IV Push once PRN Nausea and/or Vomiting  ondansetron Injectable 4 milliGRAM(s) IV Push every 6 hours PRN Nausea and/or Vomiting  oxyCODONE    IR 5 milliGRAM(s) Oral every 3 hours PRN Mild Pain (1 - 3)  oxyCODONE    IR 10 milliGRAM(s) Oral every 3 hours PRN Moderate Pain (4 - 6)  senna 2 Tablet(s) Oral at bedtime PRN Constipation      Allergies    adhesives (Rash)  No Known Drug Allergies    Intolerances        Vital Signs Last 24 Hrs  T(C): 36.4 (23 Jan 2020 11:03), Max: 37.1 (22 Jan 2020 16:46)  T(F): 97.6 (23 Jan 2020 11:03), Max: 98.7 (22 Jan 2020 16:46)  HR: 67 (23 Jan 2020 11:03) (56 - 95)  BP: 98/51 (23 Jan 2020 11:03) (82/51 - 133/68)  BP(mean): 73 (23 Jan 2020 10:01) (73 - 73)  RR: 15 (23 Jan 2020 11:03) (13 - 22)  SpO2: 98% (23 Jan 2020 11:03) (93% - 100%)    PHYSICAL EXAM:  GENERAL: NAD, well-groomed, well-developed  HEAD:  Atraumatic, Normocephalic  ENMT: Moist mucous membranes,   NECK: Supple, No JVD, Normal thyroid  NERVOUS SYSTEM:  All 4 extremities mobile, no gross sensory deficits.   CHEST/LUNG: Clear to auscultation bilaterally; No rales, rhonchi, wheezing, or rubs  HEART: Regular rate and rhythm; No murmurs, rubs, or gallops  ABDOMEN: Soft, Nontender, Nondistended; Bowel sounds present  EXTREMITIES:  2+ Peripheral Pulses, No clubbing, cyanosis, or edema      LABS:                        9.4    13.42 )-----------( 200      ( 23 Jan 2020 07:08 )             28.1     23 Jan 2020 07:08    137    |  104    |  18     ----------------------------<  134    4.2     |  26     |  1.05     Ca    8.1        23 Jan 2020 07:08          CAPILLARY BLOOD GLUCOSE          RADIOLOGY & ADDITIONAL TESTS:    Imaging Personally Reviewed:  [ ] YES     Consultant(s) Notes Reviewed:      Care Discussed with Consultants/Other Providers:    Advanced Directives: [ ] DNR  [ ] No feeding tube  [ ] MOLST in chart  [ ] MOLST completed today  [ ] Unknown

## 2020-01-23 NOTE — DISCHARGE NOTE PROVIDER - NSDCFUSCHEDAPPT_GEN_ALL_CORE_FT
THEODORA ROMERO ; 02/07/2020 ; NP Rajinder [address] [place] [address]  THEODORA ROMERO ; 03/31/2020 ; NP Rajinder [address] [place] [address] THEODORA ROMERO ; 02/07/2020 ; NP Rajinder [address] [place] [address]  THEODORA ROMERO ; 03/31/2020 ; NP Rajinder [address] [place] [address] THEODORA ROMERO ; 02/07/2020 ; NP Rajinder [address] [place] [address]  THEODORA ROMERO ; 03/31/2020 ; NP Rajinder [address] [place] [address] THEODORA ROMERO ; 02/07/2020 ; NP Rajinder [address] [place] [address]  THEODORA ROMERO ; 03/31/2020 ; NP Rajinder [address] [place] [address]

## 2020-01-23 NOTE — DISCHARGE NOTE PROVIDER - HOSPITAL COURSE
THEODORA ROMERO        Admitted on 01-22-20         73yo.  Female with history of Traumatic arthritis of right knee        Surgery:   Total replacement of right knee joint using cement        Orthopedic Surgeon:   Dr. ALCIDES Smallwood        Pam-operative antibiotic:      ceFAZolin   IVPB:,             Consulted Services : Hospitalist, Physical Therapy, Occupational Therapy        Typical Physical & occupational therapy modalities post Total replacement of right knee joint using cement     were performed including ambulation training, range of motion, ADL's, and transfers.         DVT prophylaxis: Eliquis 2.5mg twice daily             The patient had a clean appearing surgical incision with no sign of surgical site infections and had a stable neuro / vascular exam of the operated extremity.  After progression of mobility guided by the PT/ OT staff,  the patient was felt to benefit from further rehabilitative care for restoration to level of function. This was felt to best be accomplished at Home.        Discharge and Orthopedic Care instructions were delineated in the Discharge Plan and reviewed with the patient. All medications were delineated in the medication reconciliation tool and key points were reviewed with the patient.         This patient was deemed stable from an Orthopedic & medical standpoint for discharge today.    She will follow up with Dr. ALCIDES Smallwood for further Orthopedic care.

## 2020-01-23 NOTE — DISCHARGE NOTE NURSING/CASE MANAGEMENT/SOCIAL WORK - CASE MANAGER'S NAME
Mya Gorman RNC, [facility] [place]  Direct # [phone]/ Office # [phone]

## 2020-01-23 NOTE — PROGRESS NOTE ADULT - SUBJECTIVE AND OBJECTIVE BOX
Post Op Day # 1    SUBJECTIVE    75yo Female status post MARIAN/right TKR .   Patient is alert and comfortable.    Pain is controlled with current pain regimen.  Denies nausea, vomiting, chest pain, shortness of breath, abdominal pain or fever.   No new complaints.    OBJECTIVE    Vital Signs Last 24 Hrs  T(C): 36.6 (23 Jan 2020 07:07), Max: 37.1 (22 Jan 2020 16:46)  T(F): 97.9 (23 Jan 2020 07:07), Max: 98.7 (22 Jan 2020 16:46)  HR: 70 (23 Jan 2020 07:07) (56 - 95)  BP: 95/59 (23 Jan 2020 07:07) (82/51 - 133/68)  BP(mean): --  RR: 15 (23 Jan 2020 07:07) (13 - 22)  SpO2: 98% (23 Jan 2020 07:07) (93% - 100%)  I&O's Summary    22 Jan 2020 07:01  -  23 Jan 2020 07:00  --------------------------------------------------------  IN: 2540 mL / OUT: 2790 mL / NET: -250 mL        PHYSICAL EXAM    Right knee dressing is clean, dry and intact.   The calf is supple/nontender.   Sensation to light touch is grossly intact distally.   Motor function distally is intact.   No foot drop.   (2+) dorsalis pedis pulse. Capillary refill is less than 2 seconds. No cyanosis.                          9.4<L>  13.42<H> )-----------( 200      ( 23 Jan 2020 07:08 )             28.1<L>  23 Jan 2020 07:08                        11.4<L>  8.69  )-----------( 202      ( 22 Jan 2020 18:52 )             34.3<L>  22 Jan 2020 18:52    23 Jan 2020 07:08    137    |  104    |  18     ----------------------------<  134<H>  4.2     |  26     |  1.05   22 Jan 2020 18:52    141    |  110<H>  |  14     ----------------------------<  158<H>  4.0     |  24     |  0.84     Ca    8.1<L>      23 Jan 2020 07:08  Ca    8.4        22 Jan 2020 18:52        ASSESSMENT AND PLAN  - Orthopedically stable  - DVT prophylaxis: PAS + Eliquis 2.5mg twice daily  - Continue physical therapy and occupational therapy  - Weight bearing as tolerated of the right lower extremity with assistance of a walker  - Incentive spirometry encouraged  - Pain control as clinically indicated  - Disposition:  Home when medically stable and cleared by PT/OT

## 2020-01-23 NOTE — DISCHARGE NOTE PROVIDER - INSTRUCTIONS
Regular diet Regular diet  For Constipation :   • Increase your water intake. Drink at least 8 glasses of water daily.  • Try adding fiber to your diet by eating fruits, vegetables and foods that are rich in grains.  • If you do experience constipation, you may take an over-the-counter stool softener/laxative such as Pam Colace, Senekot, miralax or  Milk of Magnesia.

## 2020-01-23 NOTE — DISCHARGE NOTE PROVIDER - CARE PROVIDER_API CALL
Renee Smallwood)  Orthopedics  [address]  [place] [address]  Phone: [phone]  Fax: [phone]  Scheduled Appointment: 02/07/2020 11:00 AM

## 2020-01-24 LAB
ANION GAP SERPL CALC-SCNC: 3 MMOL/L — LOW (ref 5–17)
BUN SERPL-MCNC: 16 MG/DL — SIGNIFICANT CHANGE UP (ref 7–23)
CALCIUM SERPL-MCNC: 8.2 MG/DL — LOW (ref 8.4–10.5)
CHLORIDE SERPL-SCNC: 104 MMOL/L — SIGNIFICANT CHANGE UP (ref 96–108)
CO2 SERPL-SCNC: 28 MMOL/L — SIGNIFICANT CHANGE UP (ref 22–31)
CREAT SERPL-MCNC: 1 MG/DL — SIGNIFICANT CHANGE UP (ref 0.5–1.3)
FERRITIN SERPL-MCNC: 93 NG/ML — SIGNIFICANT CHANGE UP (ref 15–150)
FOLATE SERPL-MCNC: 3.6 NG/ML — LOW
GLUCOSE SERPL-MCNC: 90 MG/DL — SIGNIFICANT CHANGE UP (ref 70–99)
HCT VFR BLD CALC: 23.7 % — LOW (ref 34.5–45)
HCT VFR BLD CALC: 24.4 % — LOW (ref 34.5–45)
HGB BLD-MCNC: 7.9 G/DL — LOW (ref 11.5–15.5)
HGB BLD-MCNC: 8.1 G/DL — LOW (ref 11.5–15.5)
IRON SATN MFR SERPL: 13 % — LOW (ref 14–50)
IRON SATN MFR SERPL: 32 UG/DL — SIGNIFICANT CHANGE UP (ref 30–160)
MCHC RBC-ENTMCNC: 32.1 PG — SIGNIFICANT CHANGE UP (ref 27–34)
MCHC RBC-ENTMCNC: 32.1 PG — SIGNIFICANT CHANGE UP (ref 27–34)
MCHC RBC-ENTMCNC: 33.2 GM/DL — SIGNIFICANT CHANGE UP (ref 32–36)
MCHC RBC-ENTMCNC: 33.3 GM/DL — SIGNIFICANT CHANGE UP (ref 32–36)
MCV RBC AUTO: 96.3 FL — SIGNIFICANT CHANGE UP (ref 80–100)
MCV RBC AUTO: 96.8 FL — SIGNIFICANT CHANGE UP (ref 80–100)
NRBC # BLD: 0 /100 WBCS — SIGNIFICANT CHANGE UP (ref 0–0)
NRBC # BLD: 0 /100 WBCS — SIGNIFICANT CHANGE UP (ref 0–0)
PLATELET # BLD AUTO: 174 K/UL — SIGNIFICANT CHANGE UP (ref 150–400)
PLATELET # BLD AUTO: 180 K/UL — SIGNIFICANT CHANGE UP (ref 150–400)
POTASSIUM SERPL-MCNC: 4.8 MMOL/L — SIGNIFICANT CHANGE UP (ref 3.5–5.3)
POTASSIUM SERPL-SCNC: 4.8 MMOL/L — SIGNIFICANT CHANGE UP (ref 3.5–5.3)
RBC # BLD: 2.46 M/UL — LOW (ref 3.8–5.2)
RBC # BLD: 2.52 M/UL — LOW (ref 3.8–5.2)
RBC # FLD: 12.4 % — SIGNIFICANT CHANGE UP (ref 10.3–14.5)
RBC # FLD: 12.7 % — SIGNIFICANT CHANGE UP (ref 10.3–14.5)
SODIUM SERPL-SCNC: 135 MMOL/L — SIGNIFICANT CHANGE UP (ref 135–145)
TIBC SERPL-MCNC: 238 UG/DL — SIGNIFICANT CHANGE UP (ref 220–430)
UIBC SERPL-MCNC: 206 UG/DL — SIGNIFICANT CHANGE UP (ref 110–370)
VIT B12 SERPL-MCNC: 971 PG/ML — SIGNIFICANT CHANGE UP (ref 232–1245)
WBC # BLD: 10.57 K/UL — HIGH (ref 3.8–10.5)
WBC # BLD: 9.01 K/UL — SIGNIFICANT CHANGE UP (ref 3.8–10.5)
WBC # FLD AUTO: 10.57 K/UL — HIGH (ref 3.8–10.5)
WBC # FLD AUTO: 9.01 K/UL — SIGNIFICANT CHANGE UP (ref 3.8–10.5)

## 2020-01-24 PROCEDURE — 99232 SBSQ HOSP IP/OBS MODERATE 35: CPT

## 2020-01-24 RX ORDER — BUPROPION HYDROCHLORIDE 150 MG/1
100 TABLET, EXTENDED RELEASE ORAL DAILY
Refills: 0 | Status: DISCONTINUED | OUTPATIENT
Start: 2020-01-24 | End: 2020-01-25

## 2020-01-24 RX ORDER — POLYETHYLENE GLYCOL 3350 17 G/17G
17 POWDER, FOR SOLUTION ORAL
Qty: 0 | Refills: 0 | DISCHARGE
Start: 2020-01-24

## 2020-01-24 RX ORDER — FAMOTIDINE 10 MG/ML
1 INJECTION INTRAVENOUS
Qty: 0 | Refills: 0 | DISCHARGE
Start: 2020-01-24

## 2020-01-24 RX ORDER — SENNA PLUS 8.6 MG/1
2 TABLET ORAL
Qty: 0 | Refills: 0 | DISCHARGE
Start: 2020-01-24

## 2020-01-24 RX ORDER — FERROUS SULFATE 325(65) MG
325 TABLET ORAL
Refills: 0 | Status: DISCONTINUED | OUTPATIENT
Start: 2020-01-24 | End: 2020-01-25

## 2020-01-24 RX ORDER — FERROUS SULFATE 325(65) MG
1 TABLET ORAL
Qty: 0 | Refills: 0 | DISCHARGE
Start: 2020-01-24

## 2020-01-24 RX ORDER — FAMOTIDINE 10 MG/ML
1 INJECTION INTRAVENOUS
Qty: 30 | Refills: 1
Start: 2020-01-24 | End: 2020-03-23

## 2020-01-24 RX ORDER — TRAMADOL HYDROCHLORIDE 50 MG/1
1 TABLET ORAL
Qty: 42 | Refills: 0
Start: 2020-01-24 | End: 2020-01-30

## 2020-01-24 RX ORDER — ESTRADIOL 4 UG/1
0 INSERT VAGINAL
Qty: 0 | Refills: 0 | DISCHARGE

## 2020-01-24 RX ADMIN — Medication 1000 MILLIGRAM(S): at 00:02

## 2020-01-24 RX ADMIN — Medication 1000 MILLIGRAM(S): at 08:28

## 2020-01-24 RX ADMIN — CELECOXIB 200 MILLIGRAM(S): 200 CAPSULE ORAL at 20:26

## 2020-01-24 RX ADMIN — Medication 1000 MILLIGRAM(S): at 23:06

## 2020-01-24 RX ADMIN — APIXABAN 2.5 MILLIGRAM(S): 2.5 TABLET, FILM COATED ORAL at 08:28

## 2020-01-24 RX ADMIN — TRAMADOL HYDROCHLORIDE 50 MILLIGRAM(S): 50 TABLET ORAL at 04:28

## 2020-01-24 RX ADMIN — CELECOXIB 200 MILLIGRAM(S): 200 CAPSULE ORAL at 09:00

## 2020-01-24 RX ADMIN — PANTOPRAZOLE SODIUM 40 MILLIGRAM(S): 20 TABLET, DELAYED RELEASE ORAL at 12:16

## 2020-01-24 RX ADMIN — FAMOTIDINE 40 MILLIGRAM(S): 10 INJECTION INTRAVENOUS at 20:26

## 2020-01-24 RX ADMIN — CELECOXIB 200 MILLIGRAM(S): 200 CAPSULE ORAL at 20:28

## 2020-01-24 RX ADMIN — Medication 1000 MILLIGRAM(S): at 14:02

## 2020-01-24 RX ADMIN — Medication 1000 MILLIGRAM(S): at 14:10

## 2020-01-24 RX ADMIN — APIXABAN 2.5 MILLIGRAM(S): 2.5 TABLET, FILM COATED ORAL at 00:02

## 2020-01-24 RX ADMIN — TRAMADOL HYDROCHLORIDE 50 MILLIGRAM(S): 50 TABLET ORAL at 17:21

## 2020-01-24 RX ADMIN — CELECOXIB 200 MILLIGRAM(S): 200 CAPSULE ORAL at 08:27

## 2020-01-24 RX ADMIN — ONDANSETRON 4 MILLIGRAM(S): 8 TABLET, FILM COATED ORAL at 00:07

## 2020-01-24 RX ADMIN — TRAMADOL HYDROCHLORIDE 50 MILLIGRAM(S): 50 TABLET ORAL at 18:00

## 2020-01-24 RX ADMIN — Medication 1000 MILLIGRAM(S): at 23:12

## 2020-01-24 RX ADMIN — Medication 325 MILLIGRAM(S): at 14:02

## 2020-01-24 RX ADMIN — TRAMADOL HYDROCHLORIDE 50 MILLIGRAM(S): 50 TABLET ORAL at 03:58

## 2020-01-24 RX ADMIN — POLYETHYLENE GLYCOL 3350 17 GRAM(S): 17 POWDER, FOR SOLUTION ORAL at 08:31

## 2020-01-24 RX ADMIN — APIXABAN 2.5 MILLIGRAM(S): 2.5 TABLET, FILM COATED ORAL at 20:26

## 2020-01-24 RX ADMIN — Medication 1000 MILLIGRAM(S): at 09:00

## 2020-01-24 NOTE — PROGRESS NOTE ADULT - SUBJECTIVE AND OBJECTIVE BOX
Subjective: Currently resting in her chair and eating lunch.  Had an episode yesterday late afternoon while with PT where she felt light headed and broke out into a cold sweat. Today she has been feeling light headed and dizzy anytime she goes from sitting to standing.     MEDICATIONS  (STANDING):  acetaminophen   Tablet .. 1000 milliGRAM(s) Oral every 8 hours  apixaban 2.5 milliGRAM(s) Oral every 12 hours  buPROPion XL (24-Hour) Oral Tab/Cap - Peds 100 milliGRAM(s) Oral daily  celecoxib 200 milliGRAM(s) Oral every 12 hours  famotidine    Tablet 40 milliGRAM(s) Oral at bedtime  ferrous    sulfate 325 milliGRAM(s) Oral two times a day  lactated ringers. 1000 milliLiter(s) (100 mL/Hr) IV Continuous <Continuous>  pantoprazole    Tablet 40 milliGRAM(s) Oral before breakfast  polyethylene glycol 3350 17 Gram(s) Oral daily    MEDICATIONS  (PRN):  aluminum hydroxide/magnesium hydroxide/simethicone Suspension 30 milliLiter(s) Oral four times a day PRN Indigestion  benzocaine 15 mG/menthol 3.6 mG (Sugar-Free) Lozenge 1 Lozenge Oral every 4 hours PRN Sore Throat  bisacodyl Suppository 10 milliGRAM(s) Rectal daily PRN If no bowel movement by postoperative day #2  HYDROmorphone  Injectable 0.5 milliGRAM(s) IV Push every 10 minutes PRN Moderate Pain (4 - 6)  HYDROmorphone  Injectable 0.5 milliGRAM(s) IV Push every 3 hours PRN Severe Pain (7 - 10)  magnesium hydroxide Suspension 30 milliLiter(s) Oral daily PRN Constipation  ondansetron Injectable 4 milliGRAM(s) IV Push once PRN Nausea and/or Vomiting  ondansetron Injectable 4 milliGRAM(s) IV Push every 6 hours PRN Nausea and/or Vomiting  senna 2 Tablet(s) Oral at bedtime PRN Constipation  traMADol 50 milliGRAM(s) Oral every 4 hours PRN Mild Pain (1 - 3)  traMADol 100 milliGRAM(s) Oral every 6 hours PRN Moderate Pain (4 - 6)      Allergies    adhesives (Rash)  No Known Drug Allergies    Intolerances        Vital Signs Last 24 Hrs  T(C): 36.7 (24 Jan 2020 11:23), Max: 36.9 (23 Jan 2020 19:00)  T(F): 98 (24 Jan 2020 11:23), Max: 98.4 (23 Jan 2020 19:00)  HR: 55 (24 Jan 2020 11:23) (55 - 81)  BP: 108/60 (24 Jan 2020 11:23) (91/68 - 126/61)  BP(mean): 68 (23 Jan 2020 14:11) (68 - 68)  RR: 16 (24 Jan 2020 11:23) (16 - 16)  SpO2: 97% (24 Jan 2020 11:23) (96% - 98%)    PHYSICAL EXAM:  GENERAL: NAD, well-groomed, well-developed  HEAD:  Atraumatic, Normocephalic  ENMT: Moist mucous membranes,   NECK: Supple, No JVD, Normal thyroid  NERVOUS SYSTEM:  All 4 extremities mobile, no gross sensory deficits.   CHEST/LUNG: Clear to auscultation bilaterally; No rales, rhonchi, wheezing, or rubs  HEART: Regular rate and rhythm; No murmurs, rubs, or gallops  ABDOMEN: Soft, Nontender, Nondistended; Bowel sounds present  EXTREMITIES:  2+ Peripheral Pulses, No clubbing, cyanosis, or edema      LABS:                        7.9    9.01  )-----------( 174      ( 24 Jan 2020 07:12 )             23.7     24 Jan 2020 07:12    135    |  104    |  16     ----------------------------<  90     4.8     |  28     |  1.00     Ca    8.2        24 Jan 2020 07:12          CAPILLARY BLOOD GLUCOSE          RADIOLOGY & ADDITIONAL TESTS:    Imaging Personally Reviewed:  [ ] YES     Consultant(s) Notes Reviewed:      Care Discussed with Consultants/Other Providers:    Advanced Directives: [ ] DNR  [ ] No feeding tube  [ ] MOLST in chart  [ ] MOLST completed today  [ ] Unknown

## 2020-01-25 VITALS
HEART RATE: 69 BPM | RESPIRATION RATE: 16 BRPM | TEMPERATURE: 98 F | DIASTOLIC BLOOD PRESSURE: 70 MMHG | SYSTOLIC BLOOD PRESSURE: 121 MMHG | OXYGEN SATURATION: 96 %

## 2020-01-25 LAB
ANION GAP SERPL CALC-SCNC: 6 MMOL/L — SIGNIFICANT CHANGE UP (ref 5–17)
BUN SERPL-MCNC: 13 MG/DL — SIGNIFICANT CHANGE UP (ref 7–23)
CALCIUM SERPL-MCNC: 8.6 MG/DL — SIGNIFICANT CHANGE UP (ref 8.4–10.5)
CHLORIDE SERPL-SCNC: 104 MMOL/L — SIGNIFICANT CHANGE UP (ref 96–108)
CO2 SERPL-SCNC: 28 MMOL/L — SIGNIFICANT CHANGE UP (ref 22–31)
CREAT SERPL-MCNC: 0.96 MG/DL — SIGNIFICANT CHANGE UP (ref 0.5–1.3)
GLUCOSE SERPL-MCNC: 90 MG/DL — SIGNIFICANT CHANGE UP (ref 70–99)
HCT VFR BLD CALC: 26 % — LOW (ref 34.5–45)
HGB BLD-MCNC: 8.6 G/DL — LOW (ref 11.5–15.5)
MCHC RBC-ENTMCNC: 32 PG — SIGNIFICANT CHANGE UP (ref 27–34)
MCHC RBC-ENTMCNC: 33.1 GM/DL — SIGNIFICANT CHANGE UP (ref 32–36)
MCV RBC AUTO: 96.7 FL — SIGNIFICANT CHANGE UP (ref 80–100)
NRBC # BLD: 0 /100 WBCS — SIGNIFICANT CHANGE UP (ref 0–0)
PLATELET # BLD AUTO: 205 K/UL — SIGNIFICANT CHANGE UP (ref 150–400)
POTASSIUM SERPL-MCNC: 4.5 MMOL/L — SIGNIFICANT CHANGE UP (ref 3.5–5.3)
POTASSIUM SERPL-SCNC: 4.5 MMOL/L — SIGNIFICANT CHANGE UP (ref 3.5–5.3)
RBC # BLD: 2.69 M/UL — LOW (ref 3.8–5.2)
RBC # FLD: 12.7 % — SIGNIFICANT CHANGE UP (ref 10.3–14.5)
SODIUM SERPL-SCNC: 138 MMOL/L — SIGNIFICANT CHANGE UP (ref 135–145)
WBC # BLD: 8.47 K/UL — SIGNIFICANT CHANGE UP (ref 3.8–10.5)
WBC # FLD AUTO: 8.47 K/UL — SIGNIFICANT CHANGE UP (ref 3.8–10.5)

## 2020-01-25 PROCEDURE — 83540 ASSAY OF IRON: CPT

## 2020-01-25 PROCEDURE — 73562 X-RAY EXAM OF KNEE 3: CPT

## 2020-01-25 PROCEDURE — C1889: CPT

## 2020-01-25 PROCEDURE — 97110 THERAPEUTIC EXERCISES: CPT

## 2020-01-25 PROCEDURE — 82728 ASSAY OF FERRITIN: CPT

## 2020-01-25 PROCEDURE — 82607 VITAMIN B-12: CPT

## 2020-01-25 PROCEDURE — 97165 OT EVAL LOW COMPLEX 30 MIN: CPT

## 2020-01-25 PROCEDURE — 83550 IRON BINDING TEST: CPT

## 2020-01-25 PROCEDURE — 88305 TISSUE EXAM BY PATHOLOGIST: CPT

## 2020-01-25 PROCEDURE — 97116 GAIT TRAINING THERAPY: CPT

## 2020-01-25 PROCEDURE — 88300 SURGICAL PATH GROSS: CPT

## 2020-01-25 PROCEDURE — 85027 COMPLETE CBC AUTOMATED: CPT

## 2020-01-25 PROCEDURE — 97161 PT EVAL LOW COMPLEX 20 MIN: CPT

## 2020-01-25 PROCEDURE — C1713: CPT

## 2020-01-25 PROCEDURE — 99232 SBSQ HOSP IP/OBS MODERATE 35: CPT

## 2020-01-25 PROCEDURE — 97535 SELF CARE MNGMENT TRAINING: CPT

## 2020-01-25 PROCEDURE — 82746 ASSAY OF FOLIC ACID SERUM: CPT

## 2020-01-25 PROCEDURE — 88311 DECALCIFY TISSUE: CPT

## 2020-01-25 PROCEDURE — C1776: CPT

## 2020-01-25 PROCEDURE — 36415 COLL VENOUS BLD VENIPUNCTURE: CPT

## 2020-01-25 PROCEDURE — 97530 THERAPEUTIC ACTIVITIES: CPT

## 2020-01-25 PROCEDURE — 80048 BASIC METABOLIC PNL TOTAL CA: CPT

## 2020-01-25 RX ADMIN — CELECOXIB 200 MILLIGRAM(S): 200 CAPSULE ORAL at 09:30

## 2020-01-25 RX ADMIN — TRAMADOL HYDROCHLORIDE 50 MILLIGRAM(S): 50 TABLET ORAL at 08:52

## 2020-01-25 RX ADMIN — CELECOXIB 200 MILLIGRAM(S): 200 CAPSULE ORAL at 08:51

## 2020-01-25 RX ADMIN — TRAMADOL HYDROCHLORIDE 50 MILLIGRAM(S): 50 TABLET ORAL at 09:30

## 2020-01-25 RX ADMIN — Medication 325 MILLIGRAM(S): at 05:50

## 2020-01-25 RX ADMIN — Medication 1000 MILLIGRAM(S): at 05:52

## 2020-01-25 RX ADMIN — PANTOPRAZOLE SODIUM 40 MILLIGRAM(S): 20 TABLET, DELAYED RELEASE ORAL at 05:50

## 2020-01-25 RX ADMIN — APIXABAN 2.5 MILLIGRAM(S): 2.5 TABLET, FILM COATED ORAL at 08:51

## 2020-01-25 RX ADMIN — MAGNESIUM HYDROXIDE 30 MILLILITER(S): 400 TABLET, CHEWABLE ORAL at 05:50

## 2020-01-25 RX ADMIN — Medication 1000 MILLIGRAM(S): at 05:50

## 2020-01-25 RX ADMIN — BUPROPION HYDROCHLORIDE 100 MILLIGRAM(S): 150 TABLET, EXTENDED RELEASE ORAL at 09:00

## 2020-01-25 NOTE — PROGRESS NOTE ADULT - SUBJECTIVE AND OBJECTIVE BOX
Patient is a 74y old  Female who presents with a chief complaint of right knee pain-- for right TKA (24 Jan 2020 09:57)      INTERVAL HPI/OVERNIGHT EVENTS:  Pt is seen and examined.  Pain is controlled.  Matthew dizziness, chest pain and palpitation.    Pain Location & Control:     MEDICATIONS  (STANDING):  acetaminophen   Tablet .. 1000 milliGRAM(s) Oral every 8 hours  apixaban 2.5 milliGRAM(s) Oral every 12 hours  buPROPion  milliGRAM(s) Oral daily  celecoxib 200 milliGRAM(s) Oral every 12 hours  famotidine    Tablet 40 milliGRAM(s) Oral at bedtime  ferrous    sulfate 325 milliGRAM(s) Oral two times a day  pantoprazole    Tablet 40 milliGRAM(s) Oral before breakfast  polyethylene glycol 3350 17 Gram(s) Oral daily    MEDICATIONS  (PRN):  aluminum hydroxide/magnesium hydroxide/simethicone Suspension 30 milliLiter(s) Oral four times a day PRN Indigestion  benzocaine 15 mG/menthol 3.6 mG (Sugar-Free) Lozenge 1 Lozenge Oral every 4 hours PRN Sore Throat  bisacodyl Suppository 10 milliGRAM(s) Rectal daily PRN If no bowel movement by postoperative day #2  HYDROmorphone  Injectable 0.5 milliGRAM(s) IV Push every 10 minutes PRN Moderate Pain (4 - 6)  HYDROmorphone  Injectable 0.5 milliGRAM(s) IV Push every 3 hours PRN Severe Pain (7 - 10)  magnesium hydroxide Suspension 30 milliLiter(s) Oral daily PRN Constipation  ondansetron Injectable 4 milliGRAM(s) IV Push once PRN Nausea and/or Vomiting  ondansetron Injectable 4 milliGRAM(s) IV Push every 6 hours PRN Nausea and/or Vomiting  senna 2 Tablet(s) Oral at bedtime PRN Constipation  traMADol 50 milliGRAM(s) Oral every 4 hours PRN Mild Pain (1 - 3)  traMADol 100 milliGRAM(s) Oral every 6 hours PRN Moderate Pain (4 - 6)      Allergies    adhesives (Rash)  No Known Drug Allergies    Intolerances        Vital Signs Last 24 Hrs  T(C): 36.9 (25 Jan 2020 08:15), Max: 37.4 (24 Jan 2020 23:03)  T(F): 98.4 (25 Jan 2020 08:15), Max: 99.3 (24 Jan 2020 23:03)  HR: 64 (25 Jan 2020 08:15) (55 - 79)  BP: 99/66 (25 Jan 2020 08:15) (99/66 - 130/61)  BP(mean): --  RR: 18 (25 Jan 2020 08:15) (14 - 18)  SpO2: 96% (25 Jan 2020 08:15) (94% - 97%)        LABS:                        8.6    8.47  )-----------( 205      ( 25 Jan 2020 07:22 )             26.0     25 Jan 2020 07:22    138    |  104    |  13     ----------------------------<  90     4.5     |  28     |  0.96     Ca    8.6        25 Jan 2020 07:22          RADIOLOGY & ADDITIONAL TESTS:    Imaging Personally Reviewed:  [ ] YES  [ ] NO    Consultant(s) Notes Reviewed:  [ ] YES  [ ] NO    Care Discussed with Consultants/Other Providers [x ] YES  [ ] NO

## 2020-01-25 NOTE — PROGRESS NOTE ADULT - SUBJECTIVE AND OBJECTIVE BOX
ORTHOPEDIC PA PROGRESS NOTE  THEODORA ROMERO      74y Female                                                                                                                               POD #    3    STATUS POST:               Pre-Op Dx: Traumatic arthritis of right knee    Post-Op Dx:  Traumatic arthritis of right knee    Procedure: Total replacement of right knee joint using cement                                                Pain (0-10): 2  Current Pain Management:  [ ] PCA   [ x] Po Analgesics [ ] IM /IV Anagesics     T(F): --  HR: --  BP: --  RR: --  SpO2: --                        8.6    8.47  )-----------( 205      ( 25 Jan 2020 07:22 )             26.0                     01-25    138  |  104  |  13  ----------------------------<  90  4.5   |  28  |  0.96    Ca    8.6      25 Jan 2020 07:22      Physical Exam :    -   Dressing C/D/I.   -   Distal Neurvascular status intact grossly.   -   Warm well perfused; capillary refill <3 seconds   -   (+)EHL/FHL 5/5  -   (+) Sensation to light touch  -   (-) Calf tenderness Bilaterally    A/P: 74y Female s/p Traumatic arthritis of right knee    -   Ortho Stable  -   Pain control   -   Medicine to follow  -   DVT ppx:     [x ]SCDs     [ ] ASA     [x ] Eliquis     [ ] Lovenox  -   Weight bearing status:  WBAT [x ]        PWB    [ ]     TTWB  [ ]      NWB  [ ]   -  Dispo:     Home [x ]     Acute Rehab [ ]     [facility] [ ]     TBD [ ]

## 2020-01-25 NOTE — PROGRESS NOTE ADULT - ASSESSMENT
74F with Depression/Anxiety admitted for aftercare following Elective replacement of Right Knee.     S/P  Right TKR POD 1  Continue Bowel and pain control regimen.   Incentive Spirometer for lung expansion.  Work with PT to increase ambulation as per orthopedics.  Monitor Hgb and follow up electrolytes.   Orhtopedics on board and following     Leukocytosis  Most likely reactive; Remains afebrile and no signs or symptoms of Infection  Monitor Off Abx and will trend CBC     Acute Blood Loss Anemia   Most likely perioperative losses; Currently asymtomatic   Will Trend Hgb and transfuse if Hgb <7.0   Check iron Studies, Folate, and B12     Hypotension  Combination of Blood Loss Anemia and Pain medications  On IVF and can give intermittent bolus of IV Fluids  Monitor closely    Depression/Anxiety  Wellbutrin    Diet  Regular    DVT Prophylaxis  Eliquis BID    Disposition  Full Code/Inpatient  Discharge planning pending hospital course
74F with Depression/Anxiety admitted for aftercare following Elective replacement of Right Knee.     S/P  Right TKR POD 2  Continue Bowel and pain control regimen.   Incentive Spirometer for lung expansion.  Work with PT to increase ambulation as per orthopedics.  Monitor Hgb and follow up electrolytes.   Orhtopedics on board and following     Acute Blood Loss Anemia   Patient is currently symptomatic but not ready yet for transfusion; Discussed the need for transfusion but patient wants to wait one more day  Most likely perioperative losses; Start Iron supplementation and follow up on iron studies   Will Trend Hgb and transfuse if Hgb <7.0     Hypotension  Combination of Blood Loss Anemia and Pain medications  On IVF and can give intermittent bolus of IV Fluids  Monitor closely    Depression/Anxiety  Wellbutrin    Diet  Regular    DVT Prophylaxis  Eliquis BID    Disposition  Full Code/Inpatient  Discharge being held today in favor of hgb monitoring and the possible need for transfusion prior to discharge due to symptomatic anemia
74F with Depression/Anxiety admitted for aftercare following Elective replacement of Right Knee.     S/P  Right TKR   Continue Bowel and pain control regimen.   Incentive Spirometer for lung expansion.  Work with PT to increase ambulation as per orthopedics.  Monitor Hgb and follow up electrolytes.   Orhtopedics on board and following     Acute Blood Loss Anemia   Patient is currently symptomatic but not ready yet for transfusion; Discussed the need for transfusion but patient wants to wait one more day  Most likely perioperative losses; Start Iron supplementation and follow up on iron studies   Will Trend Hgb and transfuse if Hgb <7.0     Hypotension  resolved.    Depression/Anxiety  Wellbutrin    Diet  Regular    DVT Prophylaxis  Eliquis BID    Disposition  Full Code/Inpatient    Plan of care was discuss with patient, allquestions were answered, seems understand and in agreement.  Patient is medically optimized for discharge pending PT and otyho clearance.

## 2020-02-07 ENCOUNTER — APPOINTMENT (OUTPATIENT)
Dept: ORTHOPEDIC SURGERY | Facility: CLINIC | Age: 75
End: 2020-02-07
Payer: MEDICARE

## 2020-02-07 VITALS
DIASTOLIC BLOOD PRESSURE: 78 MMHG | HEART RATE: 62 BPM | BODY MASS INDEX: 22.88 KG/M2 | HEIGHT: 64 IN | WEIGHT: 134 LBS | SYSTOLIC BLOOD PRESSURE: 151 MMHG

## 2020-02-07 VITALS — TEMPERATURE: 98.3 F | DIASTOLIC BLOOD PRESSURE: 73 MMHG | SYSTOLIC BLOOD PRESSURE: 145 MMHG

## 2020-02-07 DIAGNOSIS — Z98.890 OTHER SPECIFIED POSTPROCEDURAL STATES: ICD-10-CM

## 2020-02-07 PROCEDURE — 99024 POSTOP FOLLOW-UP VISIT: CPT

## 2020-02-07 PROCEDURE — 73562 X-RAY EXAM OF KNEE 3: CPT | Mod: RT

## 2020-02-07 RX ORDER — ACETAMINOPHEN 325 MG/1
TABLET, FILM COATED ORAL
Refills: 0 | Status: ACTIVE | COMMUNITY

## 2020-02-07 RX ORDER — TRAMADOL HYDROCHLORIDE 50 MG/1
50 TABLET, COATED ORAL
Qty: 40 | Refills: 0 | Status: ACTIVE | COMMUNITY
Start: 2020-02-07 | End: 1900-01-01

## 2020-02-07 RX ORDER — ASPIRIN 81 MG
81 TABLET, DELAYED RELEASE (ENTERIC COATED) ORAL
Refills: 0 | Status: ACTIVE | COMMUNITY

## 2020-02-07 RX ORDER — TRAMADOL HYDROCHLORIDE 25 MG/1
TABLET, COATED ORAL
Refills: 0 | Status: ACTIVE | COMMUNITY

## 2020-02-07 RX ORDER — CELECOXIB 50 MG/1
CAPSULE ORAL
Refills: 0 | Status: ACTIVE | COMMUNITY

## 2020-02-07 RX ORDER — AMOXICILLIN 500 MG/1
500 CAPSULE ORAL
Qty: 12 | Refills: 2 | Status: ACTIVE | COMMUNITY
Start: 2020-02-07 | End: 1900-01-01

## 2020-02-10 ENCOUNTER — APPOINTMENT (OUTPATIENT)
Dept: ORTHOPEDIC SURGERY | Facility: CLINIC | Age: 75
End: 2020-02-10
Payer: MEDICARE

## 2020-02-10 VITALS — DIASTOLIC BLOOD PRESSURE: 69 MMHG | SYSTOLIC BLOOD PRESSURE: 146 MMHG | HEART RATE: 66 BPM | HEIGHT: 64 IN

## 2020-02-10 VITALS — TEMPERATURE: 98.4 F

## 2020-02-10 PROCEDURE — 99024 POSTOP FOLLOW-UP VISIT: CPT

## 2020-02-10 RX ORDER — CEPHALEXIN 250 MG/1
250 TABLET ORAL EVERY 8 HOURS
Qty: 15 | Refills: 0 | Status: ACTIVE | COMMUNITY
Start: 2020-02-10 | End: 1900-01-01

## 2020-02-18 RX ORDER — FAMOTIDINE 40 MG/1
40 TABLET, FILM COATED ORAL
Qty: 30 | Refills: 0 | Status: ACTIVE | COMMUNITY
Start: 2020-02-18 | End: 1900-01-01

## 2020-02-18 NOTE — PHYSICAL THERAPY INITIAL EVALUATION ADULT - PATIENT/FAMILY AGREES WITH PLAN
[FreeTextEntry1] : diastolic heart failure:  euvolemic today\par -cw carvedilol 6.25mg  bid\par spironolactone 25mg daily\par lasix 20mg every other day\par -daily weights and keep log.\par - Cardiology Dr. Buckley\par Loop recorder implanted, f/u with DR. Buckley on April 1st\par -advised to monitor site for any signs of increased redness\par \par recurrent UTI's, urinary frequency: urinary incontinence with multiple contributing factors:\par discussed use of diuretics, impaired mobility with OA of knees, walker use, urinary urgency\par has had recurrent uti's requiring hospitalizations in the past\par has had adverse reaction to oxybutynin topical gel\par \par OA of knees: \par -tylenol to 1000mg tid prn\par -celebrex daily prn--advised to avoid if possible\par -continue with physical activity as tolerated\par -discussed possible tramadol if uncontrolled\par -s/p injections with ortho recently\par \par hypothyroidism:\par controlled c/w current medications\par \par osteoporosis:\par oral bisphosphonate tx contraindicated with CKD\par start Calcium/Vit D supplement\par counselled on wt bearing exercises\par fall precautions\par consider referral to endo for possible prolia if patient agreeable. yes

## 2020-03-11 RX ORDER — CELECOXIB 100 MG/1
100 CAPSULE ORAL
Qty: 40 | Refills: 0 | Status: ACTIVE | COMMUNITY
Start: 2020-02-18 | End: 1900-01-01

## 2020-05-11 ENCOUNTER — APPOINTMENT (OUTPATIENT)
Dept: ORTHOPEDIC SURGERY | Facility: CLINIC | Age: 75
End: 2020-05-11

## 2021-04-23 ENCOUNTER — APPOINTMENT (OUTPATIENT)
Dept: ORTHOPEDIC SURGERY | Facility: CLINIC | Age: 76
End: 2021-04-23
Payer: MEDICARE

## 2021-04-23 VITALS
HEART RATE: 52 BPM | HEIGHT: 64 IN | SYSTOLIC BLOOD PRESSURE: 126 MMHG | DIASTOLIC BLOOD PRESSURE: 79 MMHG | WEIGHT: 134 LBS | TEMPERATURE: 97.8 F | BODY MASS INDEX: 22.88 KG/M2

## 2021-04-23 DIAGNOSIS — Z96.651 PRESENCE OF RIGHT ARTIFICIAL KNEE JOINT: ICD-10-CM

## 2021-04-23 DIAGNOSIS — M17.12 UNILATERAL PRIMARY OSTEOARTHRITIS, LEFT KNEE: ICD-10-CM

## 2021-04-23 PROCEDURE — 73562 X-RAY EXAM OF KNEE 3: CPT | Mod: 50

## 2021-04-23 PROCEDURE — 99215 OFFICE O/P EST HI 40 MIN: CPT

## 2021-05-06 ENCOUNTER — OUTPATIENT (OUTPATIENT)
Dept: OUTPATIENT SERVICES | Facility: HOSPITAL | Age: 76
LOS: 1 days | End: 2021-05-06
Payer: MEDICARE

## 2021-05-06 VITALS
OXYGEN SATURATION: 98 % | SYSTOLIC BLOOD PRESSURE: 126 MMHG | RESPIRATION RATE: 14 BRPM | WEIGHT: 133.38 LBS | DIASTOLIC BLOOD PRESSURE: 70 MMHG | HEIGHT: 63.5 IN | TEMPERATURE: 98 F | HEART RATE: 56 BPM

## 2021-05-06 DIAGNOSIS — Z98.890 OTHER SPECIFIED POSTPROCEDURAL STATES: Chronic | ICD-10-CM

## 2021-05-06 DIAGNOSIS — Z98.41 CATARACT EXTRACTION STATUS, RIGHT EYE: Chronic | ICD-10-CM

## 2021-05-06 DIAGNOSIS — M17.12 UNILATERAL PRIMARY OSTEOARTHRITIS, LEFT KNEE: ICD-10-CM

## 2021-05-06 DIAGNOSIS — Z98.51 TUBAL LIGATION STATUS: Chronic | ICD-10-CM

## 2021-05-06 DIAGNOSIS — Z98.49 CATARACT EXTRACTION STATUS, UNSPECIFIED EYE: Chronic | ICD-10-CM

## 2021-05-06 DIAGNOSIS — Z96.651 PRESENCE OF RIGHT ARTIFICIAL KNEE JOINT: Chronic | ICD-10-CM

## 2021-05-06 DIAGNOSIS — Z01.818 ENCOUNTER FOR OTHER PREPROCEDURAL EXAMINATION: ICD-10-CM

## 2021-05-06 DIAGNOSIS — Z96.643 PRESENCE OF ARTIFICIAL HIP JOINT, BILATERAL: Chronic | ICD-10-CM

## 2021-05-06 DIAGNOSIS — Z98.891 HISTORY OF UTERINE SCAR FROM PREVIOUS SURGERY: Chronic | ICD-10-CM

## 2021-05-06 DIAGNOSIS — Z87.81 PERSONAL HISTORY OF (HEALED) TRAUMATIC FRACTURE: Chronic | ICD-10-CM

## 2021-05-06 DIAGNOSIS — Z90.49 ACQUIRED ABSENCE OF OTHER SPECIFIED PARTS OF DIGESTIVE TRACT: Chronic | ICD-10-CM

## 2021-05-06 LAB
ALBUMIN SERPL ELPH-MCNC: 4 G/DL — SIGNIFICANT CHANGE UP (ref 3.3–5)
ALP SERPL-CCNC: 69 U/L — SIGNIFICANT CHANGE UP (ref 30–120)
ALT FLD-CCNC: 32 U/L DA — SIGNIFICANT CHANGE UP (ref 10–60)
ANION GAP SERPL CALC-SCNC: 9 MMOL/L — SIGNIFICANT CHANGE UP (ref 5–17)
APTT BLD: 32 SEC — SIGNIFICANT CHANGE UP (ref 27.5–35.5)
AST SERPL-CCNC: 20 U/L — SIGNIFICANT CHANGE UP (ref 10–40)
BILIRUB SERPL-MCNC: 0.4 MG/DL — SIGNIFICANT CHANGE UP (ref 0.2–1.2)
BLD GP AB SCN SERPL QL: SIGNIFICANT CHANGE UP
BUN SERPL-MCNC: 24 MG/DL — HIGH (ref 7–23)
CALCIUM SERPL-MCNC: 9 MG/DL — SIGNIFICANT CHANGE UP (ref 8.4–10.5)
CHLORIDE SERPL-SCNC: 102 MMOL/L — SIGNIFICANT CHANGE UP (ref 96–108)
CO2 SERPL-SCNC: 27 MMOL/L — SIGNIFICANT CHANGE UP (ref 22–31)
CREAT SERPL-MCNC: 1.07 MG/DL — SIGNIFICANT CHANGE UP (ref 0.5–1.3)
GLUCOSE SERPL-MCNC: 100 MG/DL — HIGH (ref 70–99)
HCT VFR BLD CALC: 42.2 % — SIGNIFICANT CHANGE UP (ref 34.5–45)
HGB BLD-MCNC: 14.2 G/DL — SIGNIFICANT CHANGE UP (ref 11.5–15.5)
INR BLD: 0.91 RATIO — SIGNIFICANT CHANGE UP (ref 0.88–1.16)
MCHC RBC-ENTMCNC: 31.8 PG — SIGNIFICANT CHANGE UP (ref 27–34)
MCHC RBC-ENTMCNC: 33.6 GM/DL — SIGNIFICANT CHANGE UP (ref 32–36)
MCV RBC AUTO: 94.6 FL — SIGNIFICANT CHANGE UP (ref 80–100)
NRBC # BLD: 0 /100 WBCS — SIGNIFICANT CHANGE UP (ref 0–0)
PLATELET # BLD AUTO: 225 K/UL — SIGNIFICANT CHANGE UP (ref 150–400)
POTASSIUM SERPL-MCNC: 4 MMOL/L — SIGNIFICANT CHANGE UP (ref 3.5–5.3)
POTASSIUM SERPL-SCNC: 4 MMOL/L — SIGNIFICANT CHANGE UP (ref 3.5–5.3)
PROT SERPL-MCNC: 7.3 G/DL — SIGNIFICANT CHANGE UP (ref 6–8.3)
PROTHROM AB SERPL-ACNC: 11.1 SEC — SIGNIFICANT CHANGE UP (ref 10.6–13.6)
RBC # BLD: 4.46 M/UL — SIGNIFICANT CHANGE UP (ref 3.8–5.2)
RBC # FLD: 12.9 % — SIGNIFICANT CHANGE UP (ref 10.3–14.5)
SODIUM SERPL-SCNC: 138 MMOL/L — SIGNIFICANT CHANGE UP (ref 135–145)
WBC # BLD: 5.81 K/UL — SIGNIFICANT CHANGE UP (ref 3.8–10.5)
WBC # FLD AUTO: 5.81 K/UL — SIGNIFICANT CHANGE UP (ref 3.8–10.5)

## 2021-05-06 PROCEDURE — 86901 BLOOD TYPING SEROLOGIC RH(D): CPT

## 2021-05-06 PROCEDURE — 36415 COLL VENOUS BLD VENIPUNCTURE: CPT

## 2021-05-06 PROCEDURE — 86850 RBC ANTIBODY SCREEN: CPT

## 2021-05-06 PROCEDURE — 93005 ELECTROCARDIOGRAM TRACING: CPT

## 2021-05-06 PROCEDURE — 87640 STAPH A DNA AMP PROBE: CPT

## 2021-05-06 PROCEDURE — G0463: CPT

## 2021-05-06 PROCEDURE — 93010 ELECTROCARDIOGRAM REPORT: CPT

## 2021-05-06 PROCEDURE — 85610 PROTHROMBIN TIME: CPT

## 2021-05-06 PROCEDURE — 86900 BLOOD TYPING SEROLOGIC ABO: CPT

## 2021-05-06 PROCEDURE — 85730 THROMBOPLASTIN TIME PARTIAL: CPT

## 2021-05-06 PROCEDURE — 87641 MR-STAPH DNA AMP PROBE: CPT

## 2021-05-06 PROCEDURE — 80053 COMPREHEN METABOLIC PANEL: CPT

## 2021-05-06 PROCEDURE — 85027 COMPLETE CBC AUTOMATED: CPT

## 2021-05-06 RX ORDER — BUPROPION HYDROCHLORIDE 150 MG/1
1 TABLET, EXTENDED RELEASE ORAL
Qty: 0 | Refills: 0 | DISCHARGE

## 2021-05-06 NOTE — H&P PST ADULT - NSICDXFAMILYHX_GEN_ALL_CORE_FT
FAMILY HISTORY:  Father  Still living? No  Family history of abdominal aortic aneurysm (AAA), Age at diagnosis: Age Unknown  Family history of colon cancer, Age at diagnosis: Age Unknown  Family history of colonic polyps, Age at diagnosis: Age Unknown  FHx: lung cancer, Age at diagnosis: Age Unknown    Mother  Still living? No  Family history of hypertension, Age at diagnosis: Age Unknown  Family history of kidney disease, Age at diagnosis: Age Unknown  Family history of malignant melanoma, Age at diagnosis: Age Unknown    Sibling  Still living? Unknown  Family history of cleft lip and palate, Age at diagnosis: Age Unknown    Child  Still living? No  Family history of glioblastoma, Age at diagnosis: Age Unknown

## 2021-05-06 NOTE — H&P PST ADULT - NSICDXPROBLEM_GEN_ALL_CORE_FT
PROBLEM DIAGNOSES  Problem: Primary osteoarthritis of left knee  Assessment and Plan: left TKR. medical clearance requested. instructed to stop advil, fish oil, astaxanthin and estrodiol 1 week prior to surgery. ERAS and surgical wash instructions reviewed and verbalized understanding. covid PCR appt confirmed for 5/17/21 at 0930

## 2021-05-06 NOTE — H&P PST ADULT - MUSCULOSKELETAL
left knee/no joint erythema/no joint warmth/no calf tenderness/decreased ROM/decreased ROM due to pain/joint swelling/diminished strength details… detailed exam

## 2021-05-06 NOTE — H&P PST ADULT - RS GEN PE MLT RESP DETAILS PC
airway patent/breath sounds equal/good air movement/respirations non-labored/clear to auscultation bilaterally/no rales/no rhonchi/no subcutaneous emphysema/no wheezes

## 2021-05-06 NOTE — H&P PST ADULT - NSICDXPASTMEDICALHX_GEN_ALL_CORE_FT
PAST MEDICAL HISTORY:  Anxiety     COVID-19 vaccine series completed pfizer, completed 3/1/21    Dense breasts     Depression     History of anemia post knee replacement    History of melanoma excision face 1998    History of peptic ulcer 1970    Lumbar spinal stenosis     Osteoarthritis     Osteopenia     Varicose veins

## 2021-05-06 NOTE — H&P PST ADULT - NSICDXPASTSURGICALHX_GEN_ALL_CORE_FT
PAST SURGICAL HISTORY:  H/O melanoma excision left cheek, 1998    H/O total knee replacement, right 2020    History of bilateral hip replacements 2016    History of humerus fracture age 17, s/p left orif, hardware removed    S/P ACL surgery left knee 1985    S/P appendectomy age 21    S/P  x2,  and     S/P cataract extraction right  and left     S/P ORIF (open reduction internal fixation) fracture right tibia/plateau fracture 2017    S/P tubal ligation     Status post laser cataract surgery of both eyes May 2021

## 2021-05-07 LAB
MRSA PCR RESULT.: SIGNIFICANT CHANGE UP
S AUREUS DNA NOSE QL NAA+PROBE: SIGNIFICANT CHANGE UP

## 2021-05-17 ENCOUNTER — OUTPATIENT (OUTPATIENT)
Dept: OUTPATIENT SERVICES | Facility: HOSPITAL | Age: 76
LOS: 1 days | End: 2021-05-17

## 2021-05-17 DIAGNOSIS — Z98.890 OTHER SPECIFIED POSTPROCEDURAL STATES: Chronic | ICD-10-CM

## 2021-05-17 DIAGNOSIS — M17.12 UNILATERAL PRIMARY OSTEOARTHRITIS, LEFT KNEE: ICD-10-CM

## 2021-05-17 DIAGNOSIS — Z98.49 CATARACT EXTRACTION STATUS, UNSPECIFIED EYE: Chronic | ICD-10-CM

## 2021-05-17 DIAGNOSIS — Z20.828 CONTACT WITH AND (SUSPECTED) EXPOSURE TO OTHER VIRAL COMMUNICABLE DISEASES: ICD-10-CM

## 2021-05-17 DIAGNOSIS — Z90.49 ACQUIRED ABSENCE OF OTHER SPECIFIED PARTS OF DIGESTIVE TRACT: Chronic | ICD-10-CM

## 2021-05-17 DIAGNOSIS — Z96.651 PRESENCE OF RIGHT ARTIFICIAL KNEE JOINT: Chronic | ICD-10-CM

## 2021-05-17 DIAGNOSIS — Z98.51 TUBAL LIGATION STATUS: Chronic | ICD-10-CM

## 2021-05-17 DIAGNOSIS — Z87.81 PERSONAL HISTORY OF (HEALED) TRAUMATIC FRACTURE: Chronic | ICD-10-CM

## 2021-05-17 DIAGNOSIS — Z98.891 HISTORY OF UTERINE SCAR FROM PREVIOUS SURGERY: Chronic | ICD-10-CM

## 2021-05-17 DIAGNOSIS — Z01.818 ENCOUNTER FOR OTHER PREPROCEDURAL EXAMINATION: ICD-10-CM

## 2021-05-17 DIAGNOSIS — Z96.643 PRESENCE OF ARTIFICIAL HIP JOINT, BILATERAL: Chronic | ICD-10-CM

## 2021-05-17 DIAGNOSIS — Z98.41 CATARACT EXTRACTION STATUS, RIGHT EYE: Chronic | ICD-10-CM

## 2021-05-17 LAB — SARS-COV-2 RNA SPEC QL NAA+PROBE: SIGNIFICANT CHANGE UP

## 2021-05-18 ENCOUNTER — FORM ENCOUNTER (OUTPATIENT)
Age: 76
End: 2021-05-18

## 2021-05-18 PROBLEM — M19.90 UNSPECIFIED OSTEOARTHRITIS, UNSPECIFIED SITE: Chronic | Status: ACTIVE | Noted: 2021-05-06

## 2021-05-18 PROBLEM — Z98.890 OTHER SPECIFIED POSTPROCEDURAL STATES: Chronic | Status: ACTIVE | Noted: 2020-01-07

## 2021-05-18 PROBLEM — M85.80 OTHER SPECIFIED DISORDERS OF BONE DENSITY AND STRUCTURE, UNSPECIFIED SITE: Chronic | Status: ACTIVE | Noted: 2021-05-06

## 2021-05-18 PROBLEM — M48.061 SPINAL STENOSIS, LUMBAR REGION WITHOUT NEUROGENIC CLAUDICATION: Chronic | Status: ACTIVE | Noted: 2021-05-06

## 2021-05-18 PROBLEM — I83.90 ASYMPTOMATIC VARICOSE VEINS OF UNSPECIFIED LOWER EXTREMITY: Chronic | Status: ACTIVE | Noted: 2021-05-06

## 2021-05-18 PROBLEM — R92.2 INCONCLUSIVE MAMMOGRAM: Chronic | Status: ACTIVE | Noted: 2021-05-06

## 2021-05-18 PROBLEM — Z92.29 PERSONAL HISTORY OF OTHER DRUG THERAPY: Chronic | Status: ACTIVE | Noted: 2021-05-06

## 2021-05-18 PROBLEM — Z86.2 PERSONAL HISTORY OF DISEASES OF THE BLOOD AND BLOOD-FORMING ORGANS AND CERTAIN DISORDERS INVOLVING THE IMMUNE MECHANISM: Chronic | Status: ACTIVE | Noted: 2021-05-06

## 2021-05-18 PROBLEM — Z87.11 PERSONAL HISTORY OF PEPTIC ULCER DISEASE: Chronic | Status: ACTIVE | Noted: 2021-05-06

## 2021-05-19 ENCOUNTER — TRANSCRIPTION ENCOUNTER (OUTPATIENT)
Age: 76
End: 2021-05-19

## 2021-05-19 ENCOUNTER — INPATIENT (INPATIENT)
Facility: HOSPITAL | Age: 76
LOS: 0 days | Discharge: HOME | DRG: 470 | End: 2021-05-20
Attending: ORTHOPAEDIC SURGERY | Admitting: ORTHOPAEDIC SURGERY
Payer: COMMERCIAL

## 2021-05-19 ENCOUNTER — RESULT REVIEW (OUTPATIENT)
Age: 76
End: 2021-05-19

## 2021-05-19 ENCOUNTER — APPOINTMENT (OUTPATIENT)
Dept: ORTHOPEDIC SURGERY | Facility: HOSPITAL | Age: 76
End: 2021-05-19

## 2021-05-19 VITALS
TEMPERATURE: 98 F | WEIGHT: 131.84 LBS | DIASTOLIC BLOOD PRESSURE: 69 MMHG | HEIGHT: 64 IN | RESPIRATION RATE: 12 BRPM | HEART RATE: 51 BPM | OXYGEN SATURATION: 100 % | SYSTOLIC BLOOD PRESSURE: 126 MMHG

## 2021-05-19 DIAGNOSIS — Z90.49 ACQUIRED ABSENCE OF OTHER SPECIFIED PARTS OF DIGESTIVE TRACT: Chronic | ICD-10-CM

## 2021-05-19 DIAGNOSIS — Z96.643 PRESENCE OF ARTIFICIAL HIP JOINT, BILATERAL: Chronic | ICD-10-CM

## 2021-05-19 DIAGNOSIS — Z98.891 HISTORY OF UTERINE SCAR FROM PREVIOUS SURGERY: Chronic | ICD-10-CM

## 2021-05-19 DIAGNOSIS — Z98.890 OTHER SPECIFIED POSTPROCEDURAL STATES: Chronic | ICD-10-CM

## 2021-05-19 DIAGNOSIS — Z87.81 PERSONAL HISTORY OF (HEALED) TRAUMATIC FRACTURE: Chronic | ICD-10-CM

## 2021-05-19 DIAGNOSIS — Z98.49 CATARACT EXTRACTION STATUS, UNSPECIFIED EYE: Chronic | ICD-10-CM

## 2021-05-19 DIAGNOSIS — Z98.41 CATARACT EXTRACTION STATUS, RIGHT EYE: Chronic | ICD-10-CM

## 2021-05-19 DIAGNOSIS — Z98.51 TUBAL LIGATION STATUS: Chronic | ICD-10-CM

## 2021-05-19 DIAGNOSIS — Z01.818 ENCOUNTER FOR OTHER PREPROCEDURAL EXAMINATION: ICD-10-CM

## 2021-05-19 DIAGNOSIS — Z96.651 PRESENCE OF RIGHT ARTIFICIAL KNEE JOINT: Chronic | ICD-10-CM

## 2021-05-19 DIAGNOSIS — M17.12 UNILATERAL PRIMARY OSTEOARTHRITIS, LEFT KNEE: ICD-10-CM

## 2021-05-19 PROCEDURE — 88311 DECALCIFY TISSUE: CPT | Mod: 26

## 2021-05-19 PROCEDURE — 88300 SURGICAL PATH GROSS: CPT | Mod: 26

## 2021-05-19 PROCEDURE — 27447 TOTAL KNEE ARTHROPLASTY: CPT | Mod: LT

## 2021-05-19 PROCEDURE — 88305 TISSUE EXAM BY PATHOLOGIST: CPT | Mod: 26

## 2021-05-19 PROCEDURE — 73562 X-RAY EXAM OF KNEE 3: CPT | Mod: 26,LT

## 2021-05-19 PROCEDURE — 99222 1ST HOSP IP/OBS MODERATE 55: CPT

## 2021-05-19 RX ORDER — APIXABAN 2.5 MG/1
1 TABLET, FILM COATED ORAL
Qty: 23 | Refills: 0
Start: 2021-05-19 | End: 2021-05-30

## 2021-05-19 RX ORDER — OXYCODONE HYDROCHLORIDE 5 MG/1
5 TABLET ORAL
Refills: 0 | Status: DISCONTINUED | OUTPATIENT
Start: 2021-05-19 | End: 2021-05-20

## 2021-05-19 RX ORDER — ONDANSETRON 8 MG/1
4 TABLET, FILM COATED ORAL EVERY 6 HOURS
Refills: 0 | Status: DISCONTINUED | OUTPATIENT
Start: 2021-05-19 | End: 2021-05-20

## 2021-05-19 RX ORDER — ONDANSETRON 8 MG/1
4 TABLET, FILM COATED ORAL ONCE
Refills: 0 | Status: DISCONTINUED | OUTPATIENT
Start: 2021-05-19 | End: 2021-05-19

## 2021-05-19 RX ORDER — MAGNESIUM HYDROXIDE 400 MG/1
30 TABLET, CHEWABLE ORAL DAILY
Refills: 0 | Status: DISCONTINUED | OUTPATIENT
Start: 2021-05-19 | End: 2021-05-20

## 2021-05-19 RX ORDER — CELECOXIB 200 MG/1
200 CAPSULE ORAL EVERY 12 HOURS
Refills: 0 | Status: DISCONTINUED | OUTPATIENT
Start: 2021-05-20 | End: 2021-05-20

## 2021-05-19 RX ORDER — CEFAZOLIN SODIUM 1 G
2000 VIAL (EA) INJECTION ONCE
Refills: 0 | Status: COMPLETED | OUTPATIENT
Start: 2021-05-19 | End: 2021-05-19

## 2021-05-19 RX ORDER — SODIUM CHLORIDE 9 MG/ML
1000 INJECTION, SOLUTION INTRAVENOUS
Refills: 0 | Status: DISCONTINUED | OUTPATIENT
Start: 2021-05-19 | End: 2021-05-19

## 2021-05-19 RX ORDER — BUPROPION HYDROCHLORIDE 150 MG/1
150 TABLET, EXTENDED RELEASE ORAL DAILY
Refills: 0 | Status: DISCONTINUED | OUTPATIENT
Start: 2021-05-19 | End: 2021-05-20

## 2021-05-19 RX ORDER — SERTRALINE 25 MG/1
75 TABLET, FILM COATED ORAL DAILY
Refills: 0 | Status: DISCONTINUED | OUTPATIENT
Start: 2021-05-19 | End: 2021-05-20

## 2021-05-19 RX ORDER — CHLORHEXIDINE GLUCONATE 213 G/1000ML
1 SOLUTION TOPICAL ONCE
Refills: 0 | Status: COMPLETED | OUTPATIENT
Start: 2021-05-19 | End: 2021-05-19

## 2021-05-19 RX ORDER — CEFAZOLIN SODIUM 1 G
2000 VIAL (EA) INJECTION EVERY 8 HOURS
Refills: 0 | Status: COMPLETED | OUTPATIENT
Start: 2021-05-19 | End: 2021-05-20

## 2021-05-19 RX ORDER — SENNA PLUS 8.6 MG/1
2 TABLET ORAL AT BEDTIME
Refills: 0 | Status: DISCONTINUED | OUTPATIENT
Start: 2021-05-19 | End: 2021-05-20

## 2021-05-19 RX ORDER — TRANEXAMIC ACID 100 MG/ML
1000 INJECTION, SOLUTION INTRAVENOUS ONCE
Refills: 0 | Status: COMPLETED | OUTPATIENT
Start: 2021-05-19 | End: 2021-05-19

## 2021-05-19 RX ORDER — SODIUM CHLORIDE 9 MG/ML
500 INJECTION, SOLUTION INTRAVENOUS ONCE
Refills: 0 | Status: COMPLETED | OUTPATIENT
Start: 2021-05-19 | End: 2021-05-19

## 2021-05-19 RX ORDER — SODIUM CHLORIDE 9 MG/ML
1000 INJECTION, SOLUTION INTRAVENOUS
Refills: 0 | Status: DISCONTINUED | OUTPATIENT
Start: 2021-05-19 | End: 2021-05-20

## 2021-05-19 RX ORDER — ASPIRIN/CALCIUM CARB/MAGNESIUM 324 MG
1 TABLET ORAL
Qty: 56 | Refills: 0
Start: 2021-05-19 | End: 2021-06-15

## 2021-05-19 RX ORDER — APREPITANT 80 MG/1
40 CAPSULE ORAL ONCE
Refills: 0 | Status: COMPLETED | OUTPATIENT
Start: 2021-05-19 | End: 2021-05-19

## 2021-05-19 RX ORDER — PANTOPRAZOLE SODIUM 20 MG/1
40 TABLET, DELAYED RELEASE ORAL
Refills: 0 | Status: DISCONTINUED | OUTPATIENT
Start: 2021-05-19 | End: 2021-05-20

## 2021-05-19 RX ORDER — OMEGA-3 ACID ETHYL ESTERS 1 G
1 CAPSULE ORAL
Qty: 0 | Refills: 0 | DISCHARGE

## 2021-05-19 RX ORDER — HYDROMORPHONE HYDROCHLORIDE 2 MG/ML
0.5 INJECTION INTRAMUSCULAR; INTRAVENOUS; SUBCUTANEOUS
Refills: 0 | Status: DISCONTINUED | OUTPATIENT
Start: 2021-05-19 | End: 2021-05-19

## 2021-05-19 RX ORDER — ACETAMINOPHEN 500 MG
1000 TABLET ORAL ONCE
Refills: 0 | Status: COMPLETED | OUTPATIENT
Start: 2021-05-19 | End: 2021-05-19

## 2021-05-19 RX ORDER — SODIUM CHLORIDE 9 MG/ML
500 INJECTION INTRAMUSCULAR; INTRAVENOUS; SUBCUTANEOUS ONCE
Refills: 0 | Status: COMPLETED | OUTPATIENT
Start: 2021-05-19 | End: 2021-05-19

## 2021-05-19 RX ORDER — APIXABAN 2.5 MG/1
2.5 TABLET, FILM COATED ORAL EVERY 12 HOURS
Refills: 0 | Status: DISCONTINUED | OUTPATIENT
Start: 2021-05-20 | End: 2021-05-20

## 2021-05-19 RX ORDER — POLYETHYLENE GLYCOL 3350 17 G/17G
17 POWDER, FOR SOLUTION ORAL AT BEDTIME
Refills: 0 | Status: DISCONTINUED | OUTPATIENT
Start: 2021-05-19 | End: 2021-05-20

## 2021-05-19 RX ORDER — ACETAMINOPHEN 500 MG
1000 TABLET ORAL EVERY 8 HOURS
Refills: 0 | Status: DISCONTINUED | OUTPATIENT
Start: 2021-05-20 | End: 2021-05-20

## 2021-05-19 RX ORDER — CELECOXIB 200 MG/1
1 CAPSULE ORAL
Qty: 60 | Refills: 0
Start: 2021-05-19 | End: 2021-06-17

## 2021-05-19 RX ORDER — OMEPRAZOLE 10 MG/1
1 CAPSULE, DELAYED RELEASE ORAL
Qty: 30 | Refills: 1
Start: 2021-05-19 | End: 2021-07-17

## 2021-05-19 RX ORDER — HYDROMORPHONE HYDROCHLORIDE 2 MG/ML
0.5 INJECTION INTRAMUSCULAR; INTRAVENOUS; SUBCUTANEOUS
Refills: 0 | Status: DISCONTINUED | OUTPATIENT
Start: 2021-05-19 | End: 2021-05-20

## 2021-05-19 RX ORDER — OXYCODONE HYDROCHLORIDE 5 MG/1
10 TABLET ORAL
Refills: 0 | Status: DISCONTINUED | OUTPATIENT
Start: 2021-05-19 | End: 2021-05-20

## 2021-05-19 RX ADMIN — SODIUM CHLORIDE 500 MILLILITER(S): 9 INJECTION INTRAMUSCULAR; INTRAVENOUS; SUBCUTANEOUS at 12:08

## 2021-05-19 RX ADMIN — Medication 400 MILLIGRAM(S): at 16:11

## 2021-05-19 RX ADMIN — Medication 400 MILLIGRAM(S): at 23:00

## 2021-05-19 RX ADMIN — Medication 100 MILLIGRAM(S): at 16:36

## 2021-05-19 RX ADMIN — CHLORHEXIDINE GLUCONATE 1 APPLICATION(S): 213 SOLUTION TOPICAL at 07:35

## 2021-05-19 RX ADMIN — SODIUM CHLORIDE 500 MILLILITER(S): 9 INJECTION, SOLUTION INTRAVENOUS at 14:06

## 2021-05-19 RX ADMIN — SENNA PLUS 2 TABLET(S): 8.6 TABLET ORAL at 21:26

## 2021-05-19 RX ADMIN — Medication 1000 MILLIGRAM(S): at 23:10

## 2021-05-19 RX ADMIN — SODIUM CHLORIDE 75 MILLILITER(S): 9 INJECTION, SOLUTION INTRAVENOUS at 12:07

## 2021-05-19 RX ADMIN — Medication 1000 MILLIGRAM(S): at 16:29

## 2021-05-19 RX ADMIN — APREPITANT 40 MILLIGRAM(S): 80 CAPSULE ORAL at 07:35

## 2021-05-19 NOTE — DISCHARGE NOTE NURSING/CASE MANAGEMENT/SOCIAL WORK - NSSCTYPOFSERV_GEN_ALL_CORE
[facility] - [phone]/ [phone]  Physical Therapist to visit the day after hospital discharge; Registered Nurse to follow. Please contact the home care agency at the above phone number if you have not heard from them by 12 noon on the day after your hospital discharge.  Physical Therapy 3 / week x 2 weeks

## 2021-05-19 NOTE — DISCHARGE NOTE PROVIDER - HOSPITAL COURSE
This patient was admitted to [facility] with a history of severe degenerative joint disease of the left knee.  Patient underwent Pre-Surgical Testing and was medically cleared to undergo elective procedure. Patient underwent left TKR MARIAN by Dr. Renee Smallwood on 5/19/21. Procedure was well tolerated.  No operative or rashad-operative complications arose during patient's hospital course.  Patient received antibiotic according to SCIP guidelines for infection prevention.  Eliquis 2.5mg q 12h was given for DVT prophylaxis, in addition to the use of SCDs.  Anesthesia, Medical Hospitalist, Physical Therapy and Occupational Therapy were consulted. Patient is stable for discharge with a good prognosis.  Appropriate discharge instructions and medications are provided in this document.

## 2021-05-19 NOTE — OCCUPATIONAL THERAPY INITIAL EVALUATION ADULT - LIVES WITH, PROFILE
Pt lives with her  in a private home, 4 steps to enter, full flight to the bedroom with a walk in shower. Pt was independent with ADLs, IADLs, functional mobility/transfers driving prior to admission./spouse

## 2021-05-19 NOTE — DISCHARGE NOTE PROVIDER - NSDCCPCAREPLAN_GEN_ALL_CORE_FT
PRINCIPAL DISCHARGE DIAGNOSIS  Diagnosis: Primary osteoarthritis of left knee  Assessment and Plan of Treatment: For your total knee replacement:  Physical Therapy/Occupational Therapy for: ambulation, transfers, stairs, ADL's (activities of daily living), range of motion exercises, and isometrics  -Activity  • Weight Bearing as tolerated with rolling walker.  • Ice 20 minutes several times daily with at least a 20 minute break in between icing sessions  • Take short, frequent walks increasing the distance that you walk each day as tolerated.  • Change your position every hour to decrease pain and stiffness.  • Continue the exercises taught to you by your physical therapist.  • No driving until cleared by the doctor.  • No tub baths, hot tubs, or swimming pools until instructed by your doctor.  • Do not squat down on the floor.  • Do not kneel or twist your knee.  • Range of Motion Goals: Flexion= 120 degrees, Extension = 0 degrees  You have a KANDI dressing. You may shower. Disconnect KANDI battery prior to showering. Reconnect battery after showering and press orange button to resume KANDI power. Remove KANDI dressing on post-op day #7.  Keep incision clean. DO NOT APPLY ANYTHING to incision site (salves/ointments/creams). Do not scrub incision site. Pat dry after shower.  Suture removal 2 weeks after surgery at Surgeon's office.

## 2021-05-19 NOTE — OCCUPATIONAL THERAPY INITIAL EVALUATION ADULT - ANTICIPATED DISCHARGE DISPOSITION, OT EVAL
Recommend supervision/assist for functional activities prn which pt states  will provide./home w/ level of assist

## 2021-05-19 NOTE — PHYSICAL THERAPY INITIAL EVALUATION ADULT - ADDITIONAL COMMENTS
Pt lives with  in private home with 4 VINCE with bilateral handrails, and full flight of steps with handrails in home to get to bedroom. Pt's  will be available to assist as needed. Pt has RW and standard cane.     No PT DME needs

## 2021-05-19 NOTE — DISCHARGE NOTE PROVIDER - INSTRUCTIONS
Regular Diet  Pain medicine has been prescribed for you, as needed, and it often causes constipation.  Take docusate sodium (Colace) 100mg 3x daily, while taking narcotic pain medication.   For Constipation :   • Increase your water intake. Drink at least 8 glasses of water daily.  • Try adding fiber to your diet by eating fruits, vegetables and foods that are rich in grains.  • If you do experience constipation, you may take an over-the-counter laxative such as, Senokot, Miralax or  Milk of Magnesia.

## 2021-05-19 NOTE — DISCHARGE NOTE PROVIDER - NSDCMRMEDTOKEN_GEN_ALL_CORE_FT
acetaminophen 500 mg oral tablet: 2 tab(s) orally every 8 hours, As Needed - for moderate pain  Advil 200 mg oral tablet: 2 tab(s) orally every 6 hours, As Needed - for moderate pain  Aspirin Enteric Coated 81 mg oral delayed release tablet: Start 1 tab orally every 12 hours for 28 days, once Eliquis is completed.  buPROPion 100 mg oral tablet: 1 tab(s) orally once a day  Calcium 600+D oral tablet: 1 tab(s) orally once a day  celecoxib 200 mg oral capsule: 1 cap orally every 12 hours. Take 2 hours after Aspirin.  Eliquis 2.5 mg oral tablet: 1 tab orally every 12 hours. Change to Aspirin 81mg every 12 hours for 28 days, once Eliquis is completed.   estradiol topical: 10mcg 2x week.  magnesium: 1 tab(s) orally once a day  omeprazole 20 mg oral delayed release capsule: 1 cap orally once a day   sertraline 50 mg oral tablet: 1 1/2 tab(s) orally once a day  Vitamin C 1000 mg oral tablet: 1 tab(s) orally once a day  Vitamin D3 1000 intl units (25 mcg) oral tablet: 1 tab(s) orally once a day   acetaminophen 500 mg oral tablet: 2 tab(s) orally every 8 hours, As Needed - for moderate pain  aluminum hydroxide-magnesium hydroxide 200 mg-200 mg/5 mL oral suspension: 30 milliliter(s) orally 4 times a day, As needed, Indigestion  Aspirin Enteric Coated 81 mg oral delayed release tablet: Start 1 tab orally every 12 hours for 28 days, once Eliquis is completed.  buPROPion 100 mg oral tablet: 1 tab(s) orally once a day  Calcium 600+D oral tablet: 1 tab(s) orally once a day  celecoxib 200 mg oral capsule: 1 cap orally every 12 hours. Take 2 hours after Aspirin.  Eliquis 2.5 mg oral tablet: 1 tab orally every 12 hours. Change to Aspirin 81mg every 12 hours for 28 days, once Eliquis is completed.   estradiol topical: 10mcg 2x week.  magnesium: 1 tab(s) orally once a day  magnesium hydroxide 8% oral suspension: 30 milliliter(s) orally once a day, As needed, Constipation  omeprazole 20 mg oral delayed release capsule: 1 cap orally once a day   oxyCODONE 5 mg oral tablet: 1 tab(s) orally every 4 hours, As Needed knee painMDD:six   polyethylene glycol 3350 oral powder for reconstitution: 17 gram(s) orally once a day (at bedtime)  senna oral tablet: 2 tab(s) orally once a day (at bedtime)  sertraline 50 mg oral tablet: 1 1/2 tab(s) orally once a day  Vitamin C 1000 mg oral tablet: 1 tab(s) orally once a day  Vitamin D3 1000 intl units (25 mcg) oral tablet: 1 tab(s) orally once a day   acetaminophen 500 mg oral tablet: 2 tab(s) orally every 8 hours, As Needed - for moderate pain  aluminum hydroxide-magnesium hydroxide 200 mg-200 mg/5 mL oral suspension: 30 milliliter(s) orally 4 times a day, As needed, Indigestion  Aspirin Enteric Coated 81 mg oral delayed release tablet: Start 1 tab orally every 12 hours for 28 days, once Eliquis is completed.  BuPROPion (Eqv-Wellbutrin SR): 100 milligram(s) orally once a day  Calcium 600+D oral tablet: 1 tab(s) orally once a day  celecoxib 200 mg oral capsule: 1 cap orally every 12 hours. Take 2 hours after Aspirin.  Eliquis 2.5 mg oral tablet: 1 tab orally every 12 hours. Change to Aspirin 81mg every 12 hours for 28 days, once Eliquis is completed.   estradiol topical: 10mcg 2x week.  magnesium: 1 tab(s) orally once a day  magnesium hydroxide 8% oral suspension: 30 milliliter(s) orally once a day, As needed, Constipation  omeprazole 20 mg oral delayed release capsule: 1 cap orally once a day   oxyCODONE 5 mg oral tablet: 1 tab(s) orally every 4 hours, As Needed knee painMDD:six   Physical Therapy 3 times a week for 2 weeks:  ambulation, transfers, stairs, ADL&#x27;s (activities of daily living), and range of motion exercises   Weight Bearing as tolerated with rolling walker.   Range of Motion Goals: Flexion= 120 degrees, Extension = 0 degrees  Dx: s/p Left TKR    polyethylene glycol 3350 oral powder for reconstitution: 17 gram(s) orally once a day (at bedtime)  senna oral tablet: 2 tab(s) orally once a day (at bedtime)  sertraline 50 mg oral tablet: 75 milligram(s) orally once a day   1 1/2 pills daily   Vitamin C 1000 mg oral tablet: 1 tab(s) orally once a day  Vitamin D3 1000 intl units (25 mcg) oral tablet: 1 tab(s) orally once a day

## 2021-05-19 NOTE — DISCHARGE NOTE NURSING/CASE MANAGEMENT/SOCIAL WORK - PATIENT PORTAL LINK FT
You can access the FollowMyHealth Patient Portal offered by [facility] by registering at the following website: [email or web address]. By joining [employer]’s FollowMyHealth portal, you will also be able to view your health information using other applications (apps) compatible with our system.

## 2021-05-19 NOTE — DISCHARGE NOTE PROVIDER - NSDCCPTREATMENT_GEN_ALL_CORE_FT
PRINCIPAL PROCEDURE  Procedure: Left total knee replacement  Findings and Treatment: Severe DJD left knee

## 2021-05-19 NOTE — DISCHARGE NOTE NURSING/CASE MANAGEMENT/SOCIAL WORK - NSSCNAMETXT_GEN_ALL_CORE
[facility] (formerly [facility])  [address] [place] [address]

## 2021-05-19 NOTE — CONSULT NOTE ADULT - SUBJECTIVE AND OBJECTIVE BOX
Information Obtained from:  EHR, Physical Chart, Patient at bedside (relevant EHR and Chart information verified with patient)    HPI:  74yo F admitted s/p LEFT TKR today.  Has chronic history of left knee pain since an injury to the knee at age 20. At age 34 had arthroscopy and ACL repair to the left knee and did well until 2020 after a twisting injury. She now reports pain, swelling and decreased ROM. Pain is 0-2/10 at rest and increased to 4-5/10 with prolonged standing and activity. Pain is worsened with palpation of the knee or when direct pressure applied and then 8-9/10. Pain relieved with advil.       REVIEW OF SYSTEMS:  CONSTITUTIONAL: No fever, weight loss, or fatigue  EYES: No eye pain, visual disturbances, or discharge  ENMT:  No difficulty hearing, tinnitus, vertigo; No sinus or throat pain  NECK: No pain or stiffness  BREASTS: No pain, masses, or nipple discharge  RESPIRATORY: No cough, wheezing, chills or hemoptysis; No shortness of breath  CARDIOVASCULAR: No chest pain, palpitations, dizziness, or leg swelling  GASTROINTESTINAL: No abdominal or epigastric pain. No nausea, vomiting, or hematemesis; No diarrhea or constipation. No melena or hematochezia.  GENITOURINARY: No dysuria, frequency, hematuria, or incontinence  NEUROLOGICAL: No headaches, memory loss, or tremors  SKIN: No itching, burning, rashes, or lesions   LYMPH NODES: No enlarged glands  ENDOCRINE: No heat or cold intolerance; No hair loss  MUSCULOSKELETAL: No myalgias  PSYCHIATRIC: No depression, anxiety, mood swings, or difficulty sleeping  HEME/LYMPH: No easy bruising, or bleeding gums  ALLERGY AND IMMUNOLOGIC: No hives or eczema    PAST MEDICAL & SURGICAL HISTORY:  Anxiety    Depression    History of melanoma excision  face 1998    History of anemia  post knee replacement    COVID-19 vaccine series completed  pfizer, completed 3/1/21    History of peptic ulcer  1970    Osteoarthritis    Lumbar spinal stenosis    Osteopenia    Varicose veins    Dense breasts    History of bilateral hip replacements      S/P ORIF (open reduction internal fixation) fracture  right tibia/plateau fracture     S/P   x2,  and     S/P appendectomy  age 21    S/P tubal ligation      S/P ACL surgery  left knee 1985    History of humerus fracture  age 17, s/p left orif, hardware removed    S/P cataract extraction  right  and left     Status post laser cataract surgery of both eyes  May 2021    H/O total knee replacement, right  2020    H/O melanoma excision  left cheek,     SOCIAL HISTORY:  Lives: with spouse  Smoking Hx: none  ETOH consumption: 1-2 glasses of wine daily  Illicit Drug Use:  None    Allergies    adhesives (Rash)  No Known Drug Allergies    Intolerances    Demerol (Vomiting; Nausea)  opioid-like analgesics (Other)       Home Medications:  acetaminophen 500 mg oral tablet: 2 tab(s) orally every 8 hours, As Needed - for moderate pain (19 May 2021 07:31)  Advil 200 mg oral tablet: 2 tab(s) orally every 6 hours, As Needed - for moderate pain (19 May 2021 07:31)  buPROPion 100 mg oral tablet: 1 tab(s) orally once a day (19 May 2021 07:31)  Calcium 600+D oral tablet: 1 tab(s) orally once a day (19 May 2021 07:31)  estradiol topical: 10mcg 2x week. (19 May 2021 07:31)  Fish Oil 1000 mg oral capsule: 1 cap(s) orally once a day (19 May 2021 07:31)  magnesium: 1 tab(s) orally once a day (19 May 2021 07:31)  sertraline 50 mg oral tablet: 1 1/2 tab(s) orally once a day (19 May 2021 07:31)  Vitamin C 1000 mg oral tablet: 1 tab(s) orally once a day (19 May 2021 07:31)  Vitamin D3 1000 intl units (25 mcg) oral tablet: 1 tab(s) orally once a day (19 May 2021 07:31)      FAMILY HISTORY:  FHx: lung cancer (Father)    Family history of glioblastoma (Child)    Family history of colonic polyps (Father)    Family history of colon cancer (Father)    Family history of abdominal aortic aneurysm (AAA) (Father)    Family history of malignant melanoma (Mother)    Family history of kidney disease (Mother)    Family history of hypertension (Mother)    Family history of cleft lip and palate (Sibling)        PHYSICAL EXAM:  Vital Signs Last 24 Hrs  T(C): 36.7 (19 May 2021 07:16), Max: 36.7 (19 May 2021 07:16)  T(F): 98.1 (19 May 2021 07:16), Max: 98.1 (19 May 2021 07:16)  HR: 57 (19 May 2021 08:26) (51 - 60)  BP: 103/60 (19 May 2021 08:26) (101/56 - 126/69)  BP(mean): --  RR: 14 (19 May 2021 08:26) (12 - 16)  SpO2: 98% (19 May 2021 08:26) (98% - 100%)    GENERAL: NAD, well-groomed, well-developed, awake, alert, oriented x 3, fluent and coherent speech  EYES: EOMI, PERRLA, conjunctiva and sclera clear  ENMT: No tonsillar erythema, exudates, or enlargement; Moist mucous membranes, No lesions on oral mucosa  NECK: Supple, No JVD, No Cervical LAD, No thyromegaly, No thyroid nodules  NERVOUS SYSTEM:  Good concentration;  No facial droop  CHEST/LUNG: Clear to auscultation bilaterally; No rales, rhonchi, wheezing, or rubs  HEART: Regular rate and rhythm; No murmurs, rubs, or gallops  ABDOMEN: Soft, Nontender, Nondistended, Bowel sounds present, No palpable masses or organomegaly, No bruits  EXTREMITIES:  2+ Peripheral Pulses, No clubbing, cyanosis, or edema  INCISION:  Dressing clean/dry/intact    LABS:  reviewed CBC, BMP, Coags in results section of EHR    EKG (was personally reviewed):   Yes, NSR

## 2021-05-19 NOTE — PHYSICAL THERAPY INITIAL EVALUATION ADULT - RANGE OF MOTION EXAMINATION, REHAB EVAL
will assess R LE ROM next session/bilateral upper extremity ROM was WFL (within functional limits)/Right LE ROM was WFL (within functional limits)

## 2021-05-19 NOTE — CONSULT NOTE ADULT - ASSESSMENT
POD#0 s/p LEFT TKR  - Pain control - GI side effects from several opioids, after Right TKR in 2020, went home on tramadol which she tolerated ok  - Bowel regimen  - PT/OT  - Incentive spirometry  - VTE PPx - Eliquis    Depression  - continue bupropion and sertraline  - per patient, mood stable recently    History of Peptic Ulcer - 1970  - post-op PPI for GI PPx

## 2021-05-19 NOTE — PHYSICAL THERAPY INITIAL EVALUATION ADULT - PERTINENT HX OF CURRENT PROBLEM, REHAB EVAL
76 yo female presents with very long history of left knee pain since an injury to the knee at age 20. At age 34 she had an arthroscopy radha ACL repair to the left knee and did well until a "mis=step" in December 2020 resulting in a twisting injury.

## 2021-05-20 VITALS
RESPIRATION RATE: 15 BRPM | OXYGEN SATURATION: 99 % | SYSTOLIC BLOOD PRESSURE: 103 MMHG | TEMPERATURE: 99 F | DIASTOLIC BLOOD PRESSURE: 58 MMHG | HEART RATE: 52 BPM

## 2021-05-20 LAB
ANION GAP SERPL CALC-SCNC: 8 MMOL/L — SIGNIFICANT CHANGE UP (ref 5–17)
BUN SERPL-MCNC: 15 MG/DL — SIGNIFICANT CHANGE UP (ref 7–23)
CALCIUM SERPL-MCNC: 8.5 MG/DL — SIGNIFICANT CHANGE UP (ref 8.4–10.5)
CHLORIDE SERPL-SCNC: 107 MMOL/L — SIGNIFICANT CHANGE UP (ref 96–108)
CO2 SERPL-SCNC: 25 MMOL/L — SIGNIFICANT CHANGE UP (ref 22–31)
COVID-19 SPIKE DOMAIN AB INTERP: POSITIVE
COVID-19 SPIKE DOMAIN ANTIBODY RESULT: >250 U/ML — HIGH
CREAT SERPL-MCNC: 1.02 MG/DL — SIGNIFICANT CHANGE UP (ref 0.5–1.3)
GLUCOSE SERPL-MCNC: 112 MG/DL — HIGH (ref 70–99)
HCT VFR BLD CALC: 32 % — LOW (ref 34.5–45)
HGB BLD-MCNC: 10.8 G/DL — LOW (ref 11.5–15.5)
MCHC RBC-ENTMCNC: 32.3 PG — SIGNIFICANT CHANGE UP (ref 27–34)
MCHC RBC-ENTMCNC: 33.8 GM/DL — SIGNIFICANT CHANGE UP (ref 32–36)
MCV RBC AUTO: 95.8 FL — SIGNIFICANT CHANGE UP (ref 80–100)
NRBC # BLD: 0 /100 WBCS — SIGNIFICANT CHANGE UP (ref 0–0)
PLATELET # BLD AUTO: 180 K/UL — SIGNIFICANT CHANGE UP (ref 150–400)
POTASSIUM SERPL-MCNC: 4.1 MMOL/L — SIGNIFICANT CHANGE UP (ref 3.5–5.3)
POTASSIUM SERPL-SCNC: 4.1 MMOL/L — SIGNIFICANT CHANGE UP (ref 3.5–5.3)
RBC # BLD: 3.34 M/UL — LOW (ref 3.8–5.2)
RBC # FLD: 12.9 % — SIGNIFICANT CHANGE UP (ref 10.3–14.5)
SARS-COV-2 IGG+IGM SERPL QL IA: >250 U/ML — HIGH
SARS-COV-2 IGG+IGM SERPL QL IA: POSITIVE
SODIUM SERPL-SCNC: 140 MMOL/L — SIGNIFICANT CHANGE UP (ref 135–145)
WBC # BLD: 13.42 K/UL — HIGH (ref 3.8–10.5)
WBC # FLD AUTO: 13.42 K/UL — HIGH (ref 3.8–10.5)

## 2021-05-20 PROCEDURE — 85027 COMPLETE CBC AUTOMATED: CPT

## 2021-05-20 PROCEDURE — 97535 SELF CARE MNGMENT TRAINING: CPT

## 2021-05-20 PROCEDURE — 27447 TOTAL KNEE ARTHROPLASTY: CPT

## 2021-05-20 PROCEDURE — C1776: CPT

## 2021-05-20 PROCEDURE — 99239 HOSP IP/OBS DSCHRG MGMT >30: CPT

## 2021-05-20 PROCEDURE — 73562 X-RAY EXAM OF KNEE 3: CPT

## 2021-05-20 PROCEDURE — 97110 THERAPEUTIC EXERCISES: CPT

## 2021-05-20 PROCEDURE — C1713: CPT

## 2021-05-20 PROCEDURE — 88300 SURGICAL PATH GROSS: CPT

## 2021-05-20 PROCEDURE — 80048 BASIC METABOLIC PNL TOTAL CA: CPT

## 2021-05-20 PROCEDURE — C1889: CPT

## 2021-05-20 PROCEDURE — 97530 THERAPEUTIC ACTIVITIES: CPT

## 2021-05-20 PROCEDURE — 97165 OT EVAL LOW COMPLEX 30 MIN: CPT

## 2021-05-20 PROCEDURE — 97116 GAIT TRAINING THERAPY: CPT

## 2021-05-20 PROCEDURE — 94664 DEMO&/EVAL PT USE INHALER: CPT

## 2021-05-20 PROCEDURE — 88311 DECALCIFY TISSUE: CPT

## 2021-05-20 PROCEDURE — 87635 SARS-COV-2 COVID-19 AMP PRB: CPT

## 2021-05-20 PROCEDURE — 86769 SARS-COV-2 COVID-19 ANTIBODY: CPT

## 2021-05-20 PROCEDURE — 88305 TISSUE EXAM BY PATHOLOGIST: CPT

## 2021-05-20 PROCEDURE — 36415 COLL VENOUS BLD VENIPUNCTURE: CPT

## 2021-05-20 PROCEDURE — 97161 PT EVAL LOW COMPLEX 20 MIN: CPT

## 2021-05-20 RX ORDER — SERTRALINE 25 MG/1
75 TABLET, FILM COATED ORAL
Qty: 0 | Refills: 0 | DISCHARGE

## 2021-05-20 RX ORDER — CHOLECALCIFEROL (VITAMIN D3) 125 MCG
1 CAPSULE ORAL
Qty: 0 | Refills: 0 | DISCHARGE

## 2021-05-20 RX ORDER — OXYCODONE HYDROCHLORIDE 5 MG/1
1 TABLET ORAL
Qty: 42 | Refills: 0
Start: 2021-05-20 | End: 2021-05-26

## 2021-05-20 RX ORDER — SENNA PLUS 8.6 MG/1
2 TABLET ORAL
Qty: 0 | Refills: 0 | DISCHARGE
Start: 2021-05-20

## 2021-05-20 RX ORDER — MAGNESIUM HYDROXIDE 400 MG/1
30 TABLET, CHEWABLE ORAL
Qty: 0 | Refills: 0 | DISCHARGE
Start: 2021-05-20

## 2021-05-20 RX ORDER — ESTRADIOL 4 UG/1
0 INSERT VAGINAL
Qty: 0 | Refills: 0 | DISCHARGE

## 2021-05-20 RX ORDER — ASCORBIC ACID 60 MG
1 TABLET,CHEWABLE ORAL
Qty: 0 | Refills: 0 | DISCHARGE

## 2021-05-20 RX ORDER — BUPROPION HYDROCHLORIDE 150 MG/1
100 TABLET, EXTENDED RELEASE ORAL
Qty: 0 | Refills: 0 | DISCHARGE

## 2021-05-20 RX ORDER — POLYETHYLENE GLYCOL 3350 17 G/17G
17 POWDER, FOR SOLUTION ORAL
Qty: 0 | Refills: 0 | DISCHARGE
Start: 2021-05-20

## 2021-05-20 RX ORDER — SERTRALINE 25 MG/1
1 TABLET, FILM COATED ORAL
Qty: 0 | Refills: 0 | DISCHARGE

## 2021-05-20 RX ORDER — BUPROPION HYDROCHLORIDE 150 MG/1
1 TABLET, EXTENDED RELEASE ORAL
Qty: 0 | Refills: 0 | DISCHARGE

## 2021-05-20 RX ORDER — IBUPROFEN 200 MG
2 TABLET ORAL
Qty: 0 | Refills: 0 | DISCHARGE

## 2021-05-20 RX ADMIN — APIXABAN 2.5 MILLIGRAM(S): 2.5 TABLET, FILM COATED ORAL at 09:21

## 2021-05-20 RX ADMIN — CELECOXIB 200 MILLIGRAM(S): 200 CAPSULE ORAL at 09:21

## 2021-05-20 RX ADMIN — Medication 1000 MILLIGRAM(S): at 13:41

## 2021-05-20 RX ADMIN — BUPROPION HYDROCHLORIDE 150 MILLIGRAM(S): 150 TABLET, EXTENDED RELEASE ORAL at 12:38

## 2021-05-20 RX ADMIN — Medication 1000 MILLIGRAM(S): at 06:00

## 2021-05-20 RX ADMIN — SERTRALINE 75 MILLIGRAM(S): 25 TABLET, FILM COATED ORAL at 12:38

## 2021-05-20 RX ADMIN — Medication 100 MILLIGRAM(S): at 01:34

## 2021-05-20 RX ADMIN — CELECOXIB 200 MILLIGRAM(S): 200 CAPSULE ORAL at 09:22

## 2021-05-20 RX ADMIN — Medication 1000 MILLIGRAM(S): at 13:40

## 2021-05-20 NOTE — PROGRESS NOTE ADULT - REASON FOR ADMISSION
Patient is a 75y old  Female who presents with a chief complaint of Left TKR, for severe left knee OA (19 May 2021 11:46)

## 2021-05-20 NOTE — PROGRESS NOTE ADULT - ASSESSMENT
POD#1 s/p LEFT TKR  - Pain control - GI side effects from several opioids, after Right TKR in 2020, went home on tramadol which she tolerated ok, ok with oxycodone this time  - Bowel regimen  - PT/OT  - Incentive spirometry  - VTE PPx - Eliquis  - stable for discharge from medical perspective    Depression  - continue bupropion and sertraline  - per patient, mood stable recently    History of Peptic Ulcer - 1970  - post-op PPI for GI PPx

## 2021-05-20 NOTE — PROGRESS NOTE ADULT - SUBJECTIVE AND OBJECTIVE BOX
ORTHOPEDIC PA PROGRESS NOTE  THEODORA ROMERO      75y Female                                                                                                                               POD #1        STATUS POST:               Pre-Op Dx: Osteoarthritis of left knee      Post-Op Dx:  Osteoarthritis of left knee      Procedure: Knee replacement, total, left                                                Patient comfortable pain controlled.  Voiding urine passing gas and tolerating p.o diet.  No complaints  Pain (0-10):   Current Pain Management:  [ ] PCA   [x ] Po Analgesics [ x] IM /IV Anagesics     T(F): 98.1  HR: 64  BP: 103/42  RR: 16  SpO2: 98%                          Physical Exam :    -   Surgical site clean and dry ann-marie dressing in place   -   Distal Neurvascular status intact grossly.   -   Warm well perfused; capillary refill <3 seconds   -   (+)EHL/FHL 5/5 dorsi/plantar flexion intact  -   (+) Sensation to light touch  -   (-) Calf tenderness Bilaterally    A/P: 75y Female s/p Osteoarthritis of left knee      -   Ortho Stable  -   Pain control   -   Medicine to follow  -   DVT ppx:     [ x]SCDs     [ ] ASA     [x ] Eliquis     [ ] Lovenox  -   Weight bearing status:  WBAT [x ]        PWB    [ ]     TTWB  [ ]      NWB  [ ]   -  Dispo:     Home [ x]     Acute Rehab [ ]     [facility] [ ]     TBD [ ]  - follow up am labs
Discharge medication calendar:  Eliquis 2.5mg q12h x 12 days then ASA EC 81mg q12h x 4 weeks  APAP 1000mg q8h x 2-3 weeks  Celecoxib 200mg q12h x 2-3 weeks  Omeprazole 20mg QAM x 6 weeks  Narcotic PRN  Docusate 100mg TID while taking narcotic  Miralax, Senna, or Bisacodyl PRN for treatment of constipation  
INTERVAL HPI/OVERNIGHT EVENTS:   Patient seen and examined.  Eating, voiding, ambulated.  No fevers, chills, sweats, dizziness, HA, changes in vision, cp, palpitations, sob, persistent cough, n/v/d, abd pain, dysuria, focal weakness, or calf pain.     REVIEW OF SYSTEMS:  See HPI,  all others negative    PHYSICAL EXAM:  Vital Signs Last 24 Hrs  T(C): 37.1 (20 May 2021 12:00), Max: 37.1 (20 May 2021 12:00)  T(F): 98.8 (20 May 2021 12:00), Max: 98.8 (20 May 2021 12:00)  HR: 52 (20 May 2021 12:00) (52 - 86)  BP: 103/58 (20 May 2021 12:00) (96/64 - 133/60)  BP(mean): --  RR: 15 (20 May 2021 12:00) (14 - 18)  SpO2: 99% (20 May 2021 12:00) (94% - 99%)    GENERAL: NAD, well-groomed, well-developed, awake, alert, oriented x 3, fluent and coherent speech  EYES: EOMI, PERRLA, conjunctiva and sclera clear  ENMT: No tonsillar erythema, exudates, or enlargement; Moist mucous membranes,  No lesions seen on oral mucosa  NECK: Supple, No JVD, No Cervical LAD, No thyromegaly, No thyroid nodules felt  NERVOUS SYSTEM:  Good concentration; Moving all 4 extremities against gravity and resistance; No gross sensory deficits, No facial droop  CHEST/LUNG: Clear to auscultation bilaterally with good air entry; No rales, rhonchi, wheezing, or rubs  HEART: Regular rate and rhythm; No murmurs, rubs, or gallops  ABDOMEN: Soft, Nontender, Nondistended, Bowel sounds present, No palpable masses or organomegaly, No bruits  EXTREMITIES:  2+ Peripheral Pulses, No clubbing, cyanosis, or edema, no calf tenderness in either leg  WOUND: c/d/i, no drainage    Diagnostic Testing:                        10.8   13.42 )-----------( 180      ( 20 May 2021 07:21 )             32.0     20 May 2021 07:21    140    |  107    |  15     ----------------------------<  112    4.1     |  25     |  1.02     Ca    8.5        20 May 2021 07:21               
Orthopaedic Post Op Note    Procedure: Left TKR  Surgeon: Renee Smallwood    75y Female comfortable, without complaints. Was just up with PT. Tolerated diet. Was incontinent secondary to spinal. Reported pain score = 0  Denies N/V, CP, SOB, numbness/tingling of extremities.    PE:  Vital Signs Last 24 Hrs  T(C): 36.6 (19 May 2021 13:00), Max: 36.7 (19 May 2021 07:16)  T(F): 97.9 (19 May 2021 13:00), Max: 98.1 (19 May 2021 07:16)  HR: 74 (19 May 2021 13:00) (51 - 76)  BP: 123/65 (19 May 2021 13:00) (101/56 - 133/63)  RR: 18 (19 May 2021 13:00) (12 - 19)  SpO2: 97% (19 May 2021 13:00) (97% - 100%)  General: Pt alert and oriented   Lungs: + BS CTA bilaterally  Heart: +S1 & S2 heard, RRR  Abd: + BS heard, soft, NT, ND  Left Knee Dressing: C/D/I   Bilateral LEs:  Motor:   5/5 dorsiflexion, plantarflexion, EHL  Sensation intact to LT  2+ DP Pulses  SCDs in place    A/P: 75y Female POD#0 s/p Left TKR  - Stable  - Acetaminophen, Celebrex, Oxycodone, Dilaudid for Pain Control   - DVT ppx: Eliquis 2.5mg q 12h  - Pam op IV abx: Ancef  - PT, OT per protocol  - F/U AM Labs  DCP = home tomorrow pending PT, OT, medical clearance

## 2021-06-04 ENCOUNTER — APPOINTMENT (OUTPATIENT)
Dept: ORTHOPEDIC SURGERY | Facility: CLINIC | Age: 76
End: 2021-06-04
Payer: MEDICARE

## 2021-06-04 VITALS
DIASTOLIC BLOOD PRESSURE: 76 MMHG | SYSTOLIC BLOOD PRESSURE: 127 MMHG | TEMPERATURE: 98.1 F | BODY MASS INDEX: 22.88 KG/M2 | HEART RATE: 61 BPM | HEIGHT: 64 IN | WEIGHT: 134 LBS

## 2021-06-04 PROCEDURE — 99024 POSTOP FOLLOW-UP VISIT: CPT

## 2021-06-04 PROCEDURE — 73562 X-RAY EXAM OF KNEE 3: CPT | Mod: LT

## 2021-07-23 ENCOUNTER — APPOINTMENT (OUTPATIENT)
Dept: ORTHOPEDIC SURGERY | Facility: CLINIC | Age: 76
End: 2021-07-23
Payer: MEDICARE

## 2021-07-23 VITALS
WEIGHT: 134 LBS | SYSTOLIC BLOOD PRESSURE: 118 MMHG | HEART RATE: 55 BPM | DIASTOLIC BLOOD PRESSURE: 67 MMHG | HEIGHT: 64 IN | BODY MASS INDEX: 22.88 KG/M2

## 2021-07-23 PROCEDURE — 73562 X-RAY EXAM OF KNEE 3: CPT | Mod: LT

## 2021-07-23 PROCEDURE — 99024 POSTOP FOLLOW-UP VISIT: CPT

## 2021-09-20 NOTE — H&P PST ADULT - HEMATOLOGY/LYMPHATICS
Pupils equal, round and reactive to light, Extra-ocular movement intact, eyes are clear b/l
negative

## 2021-12-17 ENCOUNTER — APPOINTMENT (OUTPATIENT)
Dept: ORTHOPEDIC SURGERY | Facility: CLINIC | Age: 76
End: 2021-12-17
Payer: MEDICARE

## 2021-12-17 VITALS
SYSTOLIC BLOOD PRESSURE: 116 MMHG | HEIGHT: 64 IN | DIASTOLIC BLOOD PRESSURE: 58 MMHG | WEIGHT: 134 LBS | BODY MASS INDEX: 22.88 KG/M2 | HEART RATE: 56 BPM

## 2021-12-17 PROCEDURE — 73562 X-RAY EXAM OF KNEE 3: CPT | Mod: LT

## 2021-12-17 PROCEDURE — 99213 OFFICE O/P EST LOW 20 MIN: CPT

## 2021-12-17 NOTE — HISTORY OF PRESENT ILLNESS
[de-identified] : Patient presents today for evaluation of a left total knee arthroplasty.  She is approximately 6 months from her surgery.  She is doing well.  She is very active.  She walks her dogs.  She is performing routine exercise.  She is looking forward to cross country skiing.  She intermittently takes Advil for muscle pain.  She does report of some mild soft tissue swelling of the left knee as compared to the right.  She is not using a cane or walker.  No use of a brace.  Overall she is progressing well.\par \par Review of Systems-\par Constitutional: No fever or chills. \par Cardiovascular: No orthopnea or chest pain\par Pulmonary: No shortness of breath. \par GI: No nausea or vomiting or abdominal pain.\par Musculoskeletal: see HPI \par Psychiatric: No anxiety and depression.

## 2021-12-17 NOTE — END OF VISIT
[Time Spent: ___ minutes] : I have spent [unfilled] minutes of time on the encounter. [FreeTextEntry3] : I, Dr. Smallwood, personally performed the evaluation and management services for this established patient who presented today with a new problem/exacerbation of an existing condition. That E/M includes conducting the examination, assessing all new/exacerbated conditions, and establishing a new plan of care. Today, MY ACP was here to observe my evaluation and management services of this new problem/exacerbated condition to be followed going forward.\par

## 2021-12-17 NOTE — DISCUSSION/SUMMARY
[de-identified] : The patient will be seen back one year from time of surgery. The patient will continue to be active. The patient will continue to increase strength and perform home exercise program. The patient was reminded of dental prophylaxis. The patient will be seen sooner than one year should there be any concerns or complications. All questions were answered to the patient's satisfaction today.

## 2021-12-17 NOTE — PHYSICAL EXAM
[de-identified] : The patient appears well nourished and in no apparent distress. The patient is alert and oriented to person, place, and time. Affect and mood appear normal. The head is normocephalic and atraumatic. The eyes reveal normal sclera and extra ocular muscles are intact. The mucous membranes are moist. Skin shows normal turgor with no evidence of eczema or psoriasis. No respiratory distress noted. MUSCULOSKELETAL / NEURO / VASCULAR:   SEE BELOW - \par   \par Neuro:\par Sensation: intact to fine & deep touch bilat. \par Motor function: Intact\par \par Vascular: \par Cap refill 1-2 sec. all toes\par Skin(LE): No cellulitis, edema, and min. venous varicosities and spider veins noted distally.  No ulcerations.\par  \par Left knee:\par Swelling: Minimal\par Effusion: No\par Alignment: Normal\par Exensor Mechanism Lag: Intact\par Flexion contracture: No\par Tenderness: Minimal posterior fossa\par Incision: Well-healed\par Skin Temp: Normal\par ROM: Flexion: 135 deg.; Extension: 0 deg,\par Laxity: A/P no, M/L minimal\par PF crepitus: No; no pain\par Quadricep formation: Intact in Extension & Flexion:\par Quad/Ham St: 5/5\par  [de-identified] : X-ray of the left knee was reviewed. Implants are in good position. No signs of loosening or fracture.

## 2022-09-02 ENCOUNTER — NON-APPOINTMENT (OUTPATIENT)
Age: 77
End: 2022-09-02

## 2022-09-02 ENCOUNTER — APPOINTMENT (OUTPATIENT)
Dept: ORTHOPEDIC SURGERY | Facility: CLINIC | Age: 77
End: 2022-09-02

## 2022-09-02 DIAGNOSIS — Z96.652 PRESENCE OF LEFT ARTIFICIAL KNEE JOINT: ICD-10-CM

## 2022-09-02 PROCEDURE — 73562 X-RAY EXAM OF KNEE 3: CPT | Mod: LT

## 2022-09-02 PROCEDURE — 99213 OFFICE O/P EST LOW 20 MIN: CPT

## 2022-09-02 NOTE — PHYSICAL EXAM
[de-identified] : Postop total knee replacement exam\par \par The patient is conversive and in no apparent distress. The patient is alert and oriented to person, place, and time. Affect and mood appear normal. The head is normocephalic and atraumatic. Skin shows normal turgor with no evidence of eczema or psoriasis. No respiratory distress noted. Sensation grossly intact. MUSCULOSKELETAL:   SEE BELOW\par \par Exam of the left knee reveals that the patient is post knee replacement surgery.  The incision is clean, dry, and well-healed. No wound dehisence noted. There is no erythema.   There is no effusion. No calf tenderness. No venous stasis or open skin wounds distally. No foot drop. Sensation is intact to light touch. \par \par 1- Alignment: measured on AP standing Xray Anatomic Alignment\par Neutral \par \par 2- Medial / Lateral Instability: measured in full extension \par Little\par \par 3- Anterior / Posterior Instability: measured at 90 degrees \par None\par \par 4 - Range of motion is from 0 to 110 degrees. \par \par 5- Flexion Contracture no\par \par 6- Extensor Lag Minus Points no [de-identified] : X-ray of the left knee was reviewed. Implants are in good position. No signs of loosening or fracture.

## 2022-09-02 NOTE — END OF VISIT
[FreeTextEntry3] : I, Dr. Smallwood, personally performed the evaluation and management services for this established patient who presented today with a new problem/exacerbation of an existing condition. That E/M includes conducting the examination, assessing all new/exacerbated conditions, and establishing a new plan of care. Today, MY ACP was here to observe my evaluation and management services of this new problem/exacerbated condition to be followed going forward.\par

## 2022-09-02 NOTE — DISCUSSION/SUMMARY
[de-identified] : 25 minutes was spent reviewing the x-rays as well as discussing with the patient their clinical presentation, diagnosis and providing education.  The patient will be seen back 2 years from time of surgery. The patient will continue to be active. The patient will continue to increase strength and perform home exercise program. The patient was reminded of dental prophylaxis. The patient will be seen sooner should there be any concerns or complications. All questions were answered to the patient's satisfaction today.

## 2022-09-02 NOTE — REASON FOR VISIT
[Follow-Up Visit] : a follow-up visit for [Artificial Knee Joint] : artificial knee joint [FreeTextEntry2] : left TKR 5/19/2021

## 2023-01-02 NOTE — CONSULT NOTE ADULT - CONSULT REASON
Alert and oriented x4. Offered fluids and snacks. Safety precautions in placed. Bed in lowest position. Upper side rails up. Treaded socks on. Reinforced the use of call light when needing assistance.    Aftercare following Elective RIght TKR

## 2023-03-15 ENCOUNTER — APPOINTMENT (OUTPATIENT)
Dept: OBGYN | Facility: CLINIC | Age: 78
End: 2023-03-15

## 2023-03-24 NOTE — H&P PST ADULT - RESPIRATORY AND THORAX
negative

## 2023-05-09 DIAGNOSIS — Z79.890 HORMONE REPLACEMENT THERAPY: ICD-10-CM

## 2023-05-17 ENCOUNTER — RESULT CHARGE (OUTPATIENT)
Age: 78
End: 2023-05-17

## 2023-05-17 ENCOUNTER — APPOINTMENT (OUTPATIENT)
Dept: OBGYN | Facility: CLINIC | Age: 78
End: 2023-05-17
Payer: MEDICARE

## 2023-05-17 VITALS
BODY MASS INDEX: 22.71 KG/M2 | SYSTOLIC BLOOD PRESSURE: 144 MMHG | WEIGHT: 133 LBS | HEART RATE: 60 BPM | DIASTOLIC BLOOD PRESSURE: 76 MMHG | HEIGHT: 64 IN

## 2023-05-17 DIAGNOSIS — Z00.00 ENCOUNTER FOR GENERAL ADULT MEDICAL EXAMINATION W/OUT ABNORMAL FINDINGS: ICD-10-CM

## 2023-05-17 LAB
BILIRUB UR QL STRIP: NORMAL
CLARITY UR: CLEAR
COLLECTION METHOD: NORMAL
GLUCOSE UR-MCNC: NORMAL
HCG UR QL: 0.2 EU/DL
HEMOGLOBIN: 13.3
HGB UR QL STRIP.AUTO: NORMAL
KETONES UR-MCNC: NORMAL
LEUKOCYTE ESTERASE UR QL STRIP: NORMAL
NITRITE UR QL STRIP: NORMAL
PH UR STRIP: 7
PROT UR STRIP-MCNC: NORMAL
SP GR UR STRIP: 1.01

## 2023-05-17 PROCEDURE — G0101: CPT

## 2023-05-17 PROCEDURE — 82270 OCCULT BLOOD FECES: CPT

## 2023-05-17 PROCEDURE — 81003 URINALYSIS AUTO W/O SCOPE: CPT | Mod: QW

## 2023-05-17 PROCEDURE — 85018 HEMOGLOBIN: CPT | Mod: QW

## 2023-05-17 NOTE — PHYSICAL EXAM
[Appropriately responsive] : appropriately responsive [Alert] : alert [No Acute Distress] : no acute distress [No Lymphadenopathy] : no lymphadenopathy [Soft] : soft [Non-tender] : non-tender [Non-distended] : non-distended [No HSM] : No HSM [No Lesions] : no lesions [No Mass] : no mass [Oriented x3] : oriented x3 [Examination Of The Breasts] : a normal appearance [Breast Nipple Inversion] : inverted [No Discharge] : no discharge [No Masses] : no breast masses were palpable [Labia Majora] : normal [Labia Minora] : normal [Atrophy] : atrophy [No Bleeding] : There was no active vaginal bleeding [Normal] : normal [Uterine Adnexae] : normal

## 2023-05-18 NOTE — PLAN
[FreeTextEntry1] : \par \par Patient to follow up in 1 year for annual GYN exam\par Mammogram and bilateral breast US due:  4/24 no rx given since she is moving to [place] she will follow up with GYN in [place] \par Colonoscopy due:  10/24\par Bone density due:  6/24 seeing ranjana\par \par Discussed with patient breast pain is normal with a normal breast exam and normal mammo. she was educated on SBE. \par \par Pap ordered\par Hemoccult ordered \par All questions answered, patient agreeable with plan.\par \par \par \par I Franci GARLAND-C am scribing for the presence of Dr. Espino the following sections HISTORY OF PRESENT ILLNESS, PAST MEDICAL/FAMILY/SOCIAL HISTORY; REVIEW OF SYSTEMS; VITAL SIGNS; PHYSICAL EXAM; DISPOSITION. \par \par \par I personally performed the services described in the documentation, reviewed the documentation recorded by the scribe in my presence and it accurately and completely records my words and actions.\par

## 2023-05-18 NOTE — HISTORY OF PRESENT ILLNESS
[TextBox_4] : Patient is a 78 yo female here today for annual visit. SHe is currently seeing dr. chilel for injections for osteoporosis. She is tolerating well at this time.\par she is moving to [place]\par she complains of right breast pain. she has hx of nipple inversion which is baseline for her.

## 2023-05-22 LAB — CYTOLOGY CVX/VAG DOC THIN PREP: NORMAL

## 2023-11-21 RX ORDER — ESTRADIOL 10 UG/1
10 TABLET, FILM COATED VAGINAL
Qty: 90 | Refills: 3 | Status: ACTIVE | COMMUNITY
Start: 2023-05-09

## 2024-02-15 NOTE — H&P PST ADULT - HISTORY OF PRESENT ILLNESS
"74 yo female presents with very long history of left knee pain since an injury to the knee at age 20. At age 34 she had an arthroscopy radha ACL repair to the left knee and did well until a "mis=step" in December 2020 resulting in a twisting injury. She now reports pain, swelling and decreased ROM. Pain is 0-2/10 at rest and increased to 4-5/10 with prolonged standing and activity. Pain is worsened with palpation of the knee or when direct pressure applied and then 8-9/10. Pain relieved with advil.

## 2024-02-27 NOTE — OCCUPATIONAL THERAPY INITIAL EVALUATION ADULT - PREDICTED DURATION OF THERAPY (DAYS/WKS), OT EVAL
Dapsone Counseling: I discussed with the patient the risks of dapsone including but not limited to hemolytic anemia, agranulocytosis, rashes, methemoglobinemia, kidney failure, peripheral neuropathy, headaches, GI upset, and liver toxicity.  Patients who start dapsone require monitoring including baseline LFTs and weekly CBCs for the first month, then every month thereafter.  The patient verbalized understanding of the proper use and possible adverse effects of dapsone.  All of the patient's questions and concerns were addressed. Physical Therapy    Patient not seen in therapy.     Patient with other health care provider    Nursing with pt to unclog NG tube.        OBJECTIVE                         Therapy procedure time and total treatment time can be found documented on the Time Entry flowsheet   Isotretinoin Pregnancy And Lactation Text: This medication is Pregnancy Category X and is considered extremely dangerous during pregnancy. It is unknown if it is excreted in breast milk. Tetracycline Counseling: Patient counseled regarding possible photosensitivity and increased risk for sunburn.  Patient instructed to avoid sunlight, if possible.  When exposed to sunlight, patients should wear protective clothing, sunglasses, and sunscreen.  The patient was instructed to call the office immediately if the following severe adverse effects occur:  hearing changes, easy bruising/bleeding, severe headache, or vision changes.  The patient verbalized understanding of the proper use and possible adverse effects of tetracycline.  All of the patient's questions and concerns were addressed. Patient understands to avoid pregnancy while on therapy due to potential birth defects. Birth Control Pills Pregnancy And Lactation Text: This medication should be avoided if pregnant and for the first 30 days post-partum. Azithromycin Counseling:  I discussed with the patient the risks of azithromycin including but not limited to GI upset, allergic reaction, drug rash, diarrhea, and yeast infections. Topical Clindamycin Pregnancy And Lactation Text: This medication is Pregnancy Category B and is considered safe during pregnancy. It is unknown if it is excreted in breast milk. Benzoyl Peroxide Counseling: Patient counseled that medicine may cause skin irritation and bleach clothing.  In the event of skin irritation, the patient was advised to reduce the amount of the drug applied or use it less frequently.   The patient verbalized understanding of the proper use and possible adverse effects of benzoyl peroxide.  All of the patient's questions and concerns were addressed. Doxycycline Pregnancy And Lactation Text: This medication is Pregnancy Category D and not consider safe during pregnancy. It is also excreted in breast milk but is considered safe for shorter treatment courses. Dapsone Pregnancy And Lactation Text: This medication is Pregnancy Category C and is not considered safe during pregnancy or breast feeding. Topical Retinoid Pregnancy And Lactation Text: This medication is Pregnancy Category C. It is unknown if this medication is excreted in breast milk. Topical Sulfur Applications Pregnancy And Lactation Text: This medication is Pregnancy Category C and has an unknown safety profile during pregnancy. It is unknown if this topical medication is excreted in breast milk. High Dose Vitamin A Pregnancy And Lactation Text: High dose vitamin A therapy is contraindicated during pregnancy and breast feeding. Birth Control Pills Counseling: Birth Control Pill Counseling: I discussed with the patient the potential side effects of OCPs including but not limited to increased risk of stroke, heart attack, thrombophlebitis, deep venous thrombosis, hepatic adenomas, breast changes, GI upset, headaches, and depression.  The patient verbalized understanding of the proper use and possible adverse effects of OCPs. All of the patient's questions and concerns were addressed. Sarecycline Pregnancy And Lactation Text: This medication is Pregnancy Category D and not consider safe during pregnancy. It is also excreted in breast milk. Benzoyl Peroxide Pregnancy And Lactation Text: This medication is Pregnancy Category C. It is unknown if benzoyl peroxide is excreted in breast milk. Isotretinoin Counseling: Patient should get monthly blood tests, not donate blood, not drive at night if vision affected, not share medication, and not undergo elective surgery for 6 months after tx completed. Side effects reviewed, pt to contact office should one occur. Spironolactone Pregnancy And Lactation Text: This medication can cause feminization of the male fetus and should be avoided during pregnancy. The active metabolite is also found in breast milk. Topical Retinoid counseling:  Patient advised to apply a pea-sized amount only at bedtime and wait 30 minutes after washing their face before applying.  If too drying, patient may add a non-comedogenic moisturizer. The patient verbalized understanding of the proper use and possible adverse effects of retinoids.  All of the patient's questions and concerns were addressed. Include Pregnancy/Lactation Warning?: No Doxycycline Counseling:  Patient counseled regarding possible photosensitivity and increased risk for sunburn.  Patient instructed to avoid sunlight, if possible.  When exposed to sunlight, patients should wear protective clothing, sunglasses, and sunscreen.  The patient was instructed to call the office immediately if the following severe adverse effects occur:  hearing changes, easy bruising/bleeding, severe headache, or vision changes.  The patient verbalized understanding of the proper use and possible adverse effects of doxycycline.  All of the patient's questions and concerns were addressed. High Dose Vitamin A Counseling: Side effects reviewed, pt to contact office should one occur. Topical Clindamycin Counseling: Patient counseled that this medication may cause skin irritation or allergic reactions.  In the event of skin irritation, the patient was advised to reduce the amount of the drug applied or use it less frequently.   The patient verbalized understanding of the proper use and possible adverse effects of clindamycin.  All of the patient's questions and concerns were addressed. Erythromycin Counseling:  I discussed with the patient the risks of erythromycin including but not limited to GI upset, allergic reaction, drug rash, diarrhea, increase in liver enzymes, and yeast infections. Bactrim Pregnancy And Lactation Text: This medication is Pregnancy Category D and is known to cause fetal risk.  It is also excreted in breast milk. Minocycline Counseling: Patient advised regarding possible photosensitivity and discoloration of the teeth, skin, lips, tongue and gums.  Patient instructed to avoid sunlight, if possible.  When exposed to sunlight, patients should wear protective clothing, sunglasses, and sunscreen.  The patient was instructed to call the office immediately if the following severe adverse effects occur:  hearing changes, easy bruising/bleeding, severe headache, or vision changes.  The patient verbalized understanding of the proper use and possible adverse effects of minocycline.  All of the patient's questions and concerns were addressed. Tazorac Pregnancy And Lactation Text: This medication is not safe during pregnancy. It is unknown if this medication is excreted in breast milk. Spironolactone Counseling: Patient advised regarding risks of diarrhea, abdominal pain, hyperkalemia, birth defects (for female patients), liver toxicity and renal toxicity. The patient may need blood work to monitor liver and kidney function and potassium levels while on therapy. The patient verbalized understanding of the proper use and possible adverse effects of spironolactone.  All of the patient's questions and concerns were addressed. 2-3 sessions Topical Sulfur Applications Counseling: Topical Sulfur Counseling: Patient counseled that this medication may cause skin irritation or allergic reactions.  In the event of skin irritation, the patient was advised to reduce the amount of the drug applied or use it less frequently.   The patient verbalized understanding of the proper use and possible adverse effects of topical sulfur application.  All of the patient's questions and concerns were addressed. Bactrim Counseling:  I discussed with the patient the risks of sulfa antibiotics including but not limited to GI upset, allergic reaction, drug rash, diarrhea, dizziness, photosensitivity, and yeast infections.  Rarely, more serious reactions can occur including but not limited to aplastic anemia, agranulocytosis, methemoglobinemia, blood dyscrasias, liver or kidney failure, lung infiltrates or desquamative/blistering drug rashes. Erythromycin Pregnancy And Lactation Text: This medication is Pregnancy Category B and is considered safe during pregnancy. It is also excreted in breast milk. Azithromycin Pregnancy And Lactation Text: This medication is considered safe during pregnancy and is also secreted in breast milk. Tazorac Counseling:  Patient advised that medication is irritating and drying.  Patient may need to apply sparingly and wash off after an hour before eventually leaving it on overnight.  The patient verbalized understanding of the proper use and possible adverse effects of tazorac.  All of the patient's questions and concerns were addressed. Sarecycline Counseling: Patient advised regarding possible photosensitivity and discoloration of the teeth, skin, lips, tongue and gums.  Patient instructed to avoid sunlight, if possible.  When exposed to sunlight, patients should wear protective clothing, sunglasses, and sunscreen.  The patient was instructed to call the office immediately if the following severe adverse effects occur:  hearing changes, easy bruising/bleeding, severe headache, or vision changes.  The patient verbalized understanding of the proper use and possible adverse effects of sarecycline.  All of the patient's questions and concerns were addressed. Detail Level: Zone

## 2024-03-25 NOTE — PHYSICAL THERAPY INITIAL EVALUATION ADULT - LEVEL OF INDEPENDENCE: STAND/SIT, REHAB EVAL
What Type Of Note Output Would You Prefer (Optional)?: Bullet Format
Hpi Title: Evaluation of Skin Lesions
Additional History: Pt here for 6 month fbse, h/o bcc. No concerns.
contact guard

## 2025-03-05 NOTE — ED PROVIDER NOTE - MUSCULOSKELETAL [-], MLM
Quality 431: Preventive Care And Screening: Unhealthy Alcohol Use - Screening: Patient not identified as an unhealthy alcohol user when screened for unhealthy alcohol use using a systematic screening method
Quality 130: Documentation Of Current Medications In The Medical Record: Current Medications Documented
Quality 226: Preventive Care And Screening: Tobacco Use: Screening And Cessation Intervention: Patient screened for tobacco use and is an ex/non-smoker
Detail Level: Detailed
Quality 47: Advance Care Plan: Advance Care Planning discussed and documented in the medical record; patient did not wish or was not able to name a surrogate decision maker or provide an advance care plan.
no neck pain/no back pain